# Patient Record
Sex: FEMALE | Race: BLACK OR AFRICAN AMERICAN | NOT HISPANIC OR LATINO | URBAN - METROPOLITAN AREA
[De-identification: names, ages, dates, MRNs, and addresses within clinical notes are randomized per-mention and may not be internally consistent; named-entity substitution may affect disease eponyms.]

---

## 2017-10-06 ENCOUNTER — OUTPATIENT (OUTPATIENT)
Dept: OUTPATIENT SERVICES | Facility: HOSPITAL | Age: 77
LOS: 1 days | End: 2017-10-06
Payer: MEDICARE

## 2017-10-06 ENCOUNTER — APPOINTMENT (OUTPATIENT)
Dept: CT IMAGING | Facility: IMAGING CENTER | Age: 77
End: 2017-10-06
Payer: MEDICARE

## 2017-10-06 DIAGNOSIS — Z98.89 OTHER SPECIFIED POSTPROCEDURAL STATES: Chronic | ICD-10-CM

## 2017-10-06 DIAGNOSIS — Z98.49 CATARACT EXTRACTION STATUS, UNSPECIFIED EYE: Chronic | ICD-10-CM

## 2017-10-06 DIAGNOSIS — Z00.8 ENCOUNTER FOR OTHER GENERAL EXAMINATION: ICD-10-CM

## 2017-10-06 PROCEDURE — 74176 CT ABD & PELVIS W/O CONTRAST: CPT

## 2017-10-06 PROCEDURE — 74176 CT ABD & PELVIS W/O CONTRAST: CPT | Mod: 26

## 2018-12-20 ENCOUNTER — APPOINTMENT (OUTPATIENT)
Dept: ORTHOPEDIC SURGERY | Facility: CLINIC | Age: 78
End: 2018-12-20
Payer: MEDICARE

## 2018-12-20 VITALS — BODY MASS INDEX: 21.16 KG/M2 | WEIGHT: 127 LBS | HEIGHT: 65 IN

## 2018-12-20 DIAGNOSIS — M16.0 BILATERAL PRIMARY OSTEOARTHRITIS OF HIP: ICD-10-CM

## 2018-12-20 PROCEDURE — 99203 OFFICE O/P NEW LOW 30 MIN: CPT

## 2018-12-20 PROCEDURE — 72170 X-RAY EXAM OF PELVIS: CPT

## 2018-12-20 PROCEDURE — 72100 X-RAY EXAM L-S SPINE 2/3 VWS: CPT

## 2018-12-20 RX ORDER — ACETAMINOPHEN 325 MG/1
325 TABLET, FILM COATED ORAL
Refills: 0 | Status: ACTIVE | COMMUNITY

## 2018-12-21 ENCOUNTER — OUTPATIENT (OUTPATIENT)
Dept: OUTPATIENT SERVICES | Facility: HOSPITAL | Age: 78
LOS: 1 days | End: 2018-12-21
Payer: MEDICARE

## 2018-12-21 ENCOUNTER — APPOINTMENT (OUTPATIENT)
Dept: MRI IMAGING | Facility: CLINIC | Age: 78
End: 2018-12-21
Payer: MEDICARE

## 2018-12-21 DIAGNOSIS — Z98.49 CATARACT EXTRACTION STATUS, UNSPECIFIED EYE: Chronic | ICD-10-CM

## 2018-12-21 DIAGNOSIS — Z00.8 ENCOUNTER FOR OTHER GENERAL EXAMINATION: ICD-10-CM

## 2018-12-21 DIAGNOSIS — Z98.89 OTHER SPECIFIED POSTPROCEDURAL STATES: Chronic | ICD-10-CM

## 2018-12-21 PROCEDURE — 72148 MRI LUMBAR SPINE W/O DYE: CPT

## 2018-12-21 PROCEDURE — 72148 MRI LUMBAR SPINE W/O DYE: CPT | Mod: 26

## 2019-07-19 ENCOUNTER — EMERGENCY (EMERGENCY)
Facility: HOSPITAL | Age: 79
LOS: 1 days | Discharge: HOME | End: 2019-07-19
Attending: EMERGENCY MEDICINE | Admitting: EMERGENCY MEDICINE
Payer: MEDICARE

## 2019-07-19 VITALS
SYSTOLIC BLOOD PRESSURE: 168 MMHG | DIASTOLIC BLOOD PRESSURE: 77 MMHG | TEMPERATURE: 98 F | OXYGEN SATURATION: 100 % | HEART RATE: 89 BPM | RESPIRATION RATE: 16 BRPM

## 2019-07-19 VITALS
DIASTOLIC BLOOD PRESSURE: 80 MMHG | SYSTOLIC BLOOD PRESSURE: 174 MMHG | OXYGEN SATURATION: 100 % | HEART RATE: 63 BPM | TEMPERATURE: 98 F | RESPIRATION RATE: 16 BRPM

## 2019-07-19 DIAGNOSIS — Z98.89 OTHER SPECIFIED POSTPROCEDURAL STATES: Chronic | ICD-10-CM

## 2019-07-19 DIAGNOSIS — Z98.49 CATARACT EXTRACTION STATUS, UNSPECIFIED EYE: Chronic | ICD-10-CM

## 2019-07-19 LAB
ALBUMIN SERPL ELPH-MCNC: 4.5 G/DL — SIGNIFICANT CHANGE UP (ref 3.3–5)
ALP SERPL-CCNC: 59 U/L — SIGNIFICANT CHANGE UP (ref 40–120)
ALT FLD-CCNC: 13 U/L — SIGNIFICANT CHANGE UP (ref 4–33)
ANION GAP SERPL CALC-SCNC: 9 MMO/L — SIGNIFICANT CHANGE UP (ref 7–14)
APTT BLD: 31.4 SEC — SIGNIFICANT CHANGE UP (ref 27.5–36.3)
AST SERPL-CCNC: 16 U/L — SIGNIFICANT CHANGE UP (ref 4–32)
BASOPHILS # BLD AUTO: 0.01 K/UL — SIGNIFICANT CHANGE UP (ref 0–0.2)
BASOPHILS NFR BLD AUTO: 0.2 % — SIGNIFICANT CHANGE UP (ref 0–2)
BILIRUB SERPL-MCNC: 0.4 MG/DL — SIGNIFICANT CHANGE UP (ref 0.2–1.2)
BUN SERPL-MCNC: 9 MG/DL — SIGNIFICANT CHANGE UP (ref 7–23)
CALCIUM SERPL-MCNC: 10.1 MG/DL — SIGNIFICANT CHANGE UP (ref 8.4–10.5)
CHLORIDE SERPL-SCNC: 104 MMOL/L — SIGNIFICANT CHANGE UP (ref 98–107)
CO2 SERPL-SCNC: 28 MMOL/L — SIGNIFICANT CHANGE UP (ref 22–31)
CREAT SERPL-MCNC: 0.9 MG/DL — SIGNIFICANT CHANGE UP (ref 0.5–1.3)
EOSINOPHIL # BLD AUTO: 0.08 K/UL — SIGNIFICANT CHANGE UP (ref 0–0.5)
EOSINOPHIL NFR BLD AUTO: 1.8 % — SIGNIFICANT CHANGE UP (ref 0–6)
GLUCOSE SERPL-MCNC: 98 MG/DL — SIGNIFICANT CHANGE UP (ref 70–99)
HCT VFR BLD CALC: 38.4 % — SIGNIFICANT CHANGE UP (ref 34.5–45)
HGB BLD-MCNC: 12.7 G/DL — SIGNIFICANT CHANGE UP (ref 11.5–15.5)
IMM GRANULOCYTES NFR BLD AUTO: 0.2 % — SIGNIFICANT CHANGE UP (ref 0–1.5)
INR BLD: 1.1 — SIGNIFICANT CHANGE UP (ref 0.88–1.17)
LIDOCAIN IGE QN: 43.8 U/L — SIGNIFICANT CHANGE UP (ref 7–60)
LYMPHOCYTES # BLD AUTO: 1.69 K/UL — SIGNIFICANT CHANGE UP (ref 1–3.3)
LYMPHOCYTES # BLD AUTO: 37.3 % — SIGNIFICANT CHANGE UP (ref 13–44)
MCHC RBC-ENTMCNC: 29.1 PG — SIGNIFICANT CHANGE UP (ref 27–34)
MCHC RBC-ENTMCNC: 33.1 % — SIGNIFICANT CHANGE UP (ref 32–36)
MCV RBC AUTO: 88.1 FL — SIGNIFICANT CHANGE UP (ref 80–100)
MONOCYTES # BLD AUTO: 0.43 K/UL — SIGNIFICANT CHANGE UP (ref 0–0.9)
MONOCYTES NFR BLD AUTO: 9.5 % — SIGNIFICANT CHANGE UP (ref 2–14)
NEUTROPHILS # BLD AUTO: 2.31 K/UL — SIGNIFICANT CHANGE UP (ref 1.8–7.4)
NEUTROPHILS NFR BLD AUTO: 51 % — SIGNIFICANT CHANGE UP (ref 43–77)
NRBC # FLD: 0 K/UL — SIGNIFICANT CHANGE UP (ref 0–0)
PLATELET # BLD AUTO: 168 K/UL — SIGNIFICANT CHANGE UP (ref 150–400)
PMV BLD: 12.2 FL — SIGNIFICANT CHANGE UP (ref 7–13)
POTASSIUM SERPL-MCNC: 4.3 MMOL/L — SIGNIFICANT CHANGE UP (ref 3.5–5.3)
POTASSIUM SERPL-SCNC: 4.3 MMOL/L — SIGNIFICANT CHANGE UP (ref 3.5–5.3)
PROT SERPL-MCNC: 7.7 G/DL — SIGNIFICANT CHANGE UP (ref 6–8.3)
PROTHROM AB SERPL-ACNC: 12.3 SEC — SIGNIFICANT CHANGE UP (ref 9.8–13.1)
RBC # BLD: 4.36 M/UL — SIGNIFICANT CHANGE UP (ref 3.8–5.2)
RBC # FLD: 12.3 % — SIGNIFICANT CHANGE UP (ref 10.3–14.5)
SODIUM SERPL-SCNC: 141 MMOL/L — SIGNIFICANT CHANGE UP (ref 135–145)
WBC # BLD: 4.53 K/UL — SIGNIFICANT CHANGE UP (ref 3.8–10.5)
WBC # FLD AUTO: 4.53 K/UL — SIGNIFICANT CHANGE UP (ref 3.8–10.5)

## 2019-07-19 PROCEDURE — 71250 CT THORAX DX C-: CPT | Mod: 26

## 2019-07-19 PROCEDURE — 74176 CT ABD & PELVIS W/O CONTRAST: CPT | Mod: 26

## 2019-07-19 PROCEDURE — 99284 EMERGENCY DEPT VISIT MOD MDM: CPT | Mod: 25,GC

## 2019-07-19 PROCEDURE — 93010 ELECTROCARDIOGRAM REPORT: CPT

## 2019-07-19 PROCEDURE — 71046 X-RAY EXAM CHEST 2 VIEWS: CPT | Mod: 26

## 2019-07-19 RX ORDER — LIDOCAINE 4 G/100G
1 CREAM TOPICAL ONCE
Refills: 0 | Status: COMPLETED | OUTPATIENT
Start: 2019-07-19 | End: 2019-07-19

## 2019-07-19 RX ORDER — ACETAMINOPHEN 500 MG
650 TABLET ORAL ONCE
Refills: 0 | Status: COMPLETED | OUTPATIENT
Start: 2019-07-19 | End: 2019-07-19

## 2019-07-19 RX ADMIN — Medication 650 MILLIGRAM(S): at 14:29

## 2019-07-19 RX ADMIN — LIDOCAINE 1 PATCH: 4 CREAM TOPICAL at 14:29

## 2019-07-19 NOTE — ED ADULT TRIAGE NOTE - CHIEF COMPLAINT QUOTE
Pt c/o falling down stairs this am after missing a step and fell into book case. C/O left posterior rib pain that increasing with deep breathing.  Denies head trauma or LOC.  Says bookcase saved her from falling to floor

## 2019-07-19 NOTE — ED PROVIDER NOTE - CLINICAL SUMMARY MEDICAL DECISION MAKING FREE TEXT BOX
78 y/o female with PMHx of Osteoporosis who presented to the ED for left rib pain s/p fall. Concern for rib fx/contusion. Labs, EKG, CT.

## 2019-07-19 NOTE — ED ADULT NURSE NOTE - NSIMPLEMENTINTERV_GEN_ALL_ED
Implemented All Universal Safety Interventions:  [place] to call system. Call bell, personal items and telephone within reach. Instruct patient to call for assistance. Room bathroom lighting operational. Non-slip footwear when patient is off stretcher. Physically safe environment: no spills, clutter or unnecessary equipment. Stretcher in lowest position, wheels locked, appropriate side rails in place.

## 2019-07-19 NOTE — ED PROVIDER NOTE - PSH
H/O tubal ligation    History of cataract surgery  b/l 2012  S/P bladder repair  2012  S/P breast biopsy, left  1962  S/P rhinoplasty  1993

## 2019-07-19 NOTE — ED PROVIDER NOTE - OBJECTIVE STATEMENT
78 y/o female with PMHx of Osteoporosis who presented to the ED for left rib pain s/p fall. Pt states she was coming down the stairs and slipped hitting her left side into a bookcase. Pt denies hitting her head or any LOC. Pt noted since having left sided rib pain, worse with deep inspiration. Pt denies any headache, neck pain, back pain, fever, chills, nausea, vomiting, SOB, chest pain, or abd pain.

## 2019-07-19 NOTE — ED PROVIDER NOTE - ATTENDING CONTRIBUTION TO CARE
Pt was seen and evaluated by me. Pt is a 80 y/o female with PMHx of Osteoporosis who presented to the ED for left rib pain s/p fall. Pt states she was coming down the stairs and slipped hitting her left side into a bookcase. Pt denies hitting her head or any LOC. Pt noted since having left sided rib pain, worse with deep inspiration. Pt denies any headache, neck pain, back pain, fever, chills, nausea, vomiting, SOB, chest pain, or abd pain. Lungs CTA b/l. RRR. Abd soft, non-tender. Tender to left intercostal area on left. No bruising noted. No midline neck or back tenderness. 5/5 b/l UE and LE.

## 2019-07-19 NOTE — ED PROVIDER NOTE - CARE PLAN
Principal Discharge DX:	Rib pain on left side  Secondary Diagnosis:	Fall, initial encounter Principal Discharge DX:	Rib contusion, left, initial encounter  Secondary Diagnosis:	Fall, initial encounter

## 2019-07-19 NOTE — ED ADULT NURSE NOTE - OBJECTIVE STATEMENT
79yof A&ox4 amb presents to ed s/p mechanical fall. pt missed step and fell into book shelf. pt did not hit head, states landed into bookshelf preventing her from hitting the ground. pt c/o L side/back pain. bruising noted to area. pt reports pain w/ inspiration and coughing. pt breathing even/unlabroed. abdomen soft, nontender, nondistended. skin warm, dry, and intact. 20g iv lock placed to R ac. labs sent.

## 2019-07-19 NOTE — ED PROVIDER NOTE - PROGRESS NOTE DETAILS
Elizabeth Goldberger PGY-3: pt feeling better after lido patch. CT scans neg for rib fx. Will dc Elizabeth Goldberger PGY-3: pt feeling better after lido patch. CT scans neg for rib fx. Will dc w incentive spirometer

## 2019-07-19 NOTE — ED ADULT NURSE NOTE - PMH
Age related osteoporosis    Arthritis  back  Bladder prolapse, female, acquired    Hay fever    Hiatal hernia

## 2019-08-14 ENCOUNTER — APPOINTMENT (OUTPATIENT)
Dept: ORTHOPEDIC SURGERY | Facility: CLINIC | Age: 79
End: 2019-08-14
Payer: MEDICARE

## 2019-08-14 VITALS
DIASTOLIC BLOOD PRESSURE: 78 MMHG | SYSTOLIC BLOOD PRESSURE: 178 MMHG | WEIGHT: 128 LBS | HEIGHT: 66 IN | HEART RATE: 62 BPM | BODY MASS INDEX: 20.57 KG/M2

## 2019-08-14 DIAGNOSIS — Z78.9 OTHER SPECIFIED HEALTH STATUS: ICD-10-CM

## 2019-08-14 PROCEDURE — 99214 OFFICE O/P EST MOD 30 MIN: CPT

## 2019-08-14 PROCEDURE — 72100 X-RAY EXAM L-S SPINE 2/3 VWS: CPT

## 2019-08-14 RX ORDER — NETTLE LEAF 2 %
POWDER (GRAM) MISCELLANEOUS
Refills: 0 | Status: ACTIVE | COMMUNITY

## 2019-08-14 RX ORDER — CHOLECALCIFEROL (VITAMIN D3) 25 MCG
TABLET ORAL
Refills: 0 | Status: ACTIVE | COMMUNITY

## 2019-08-14 RX ORDER — ASCORBIC ACID 500 MG
TABLET ORAL
Refills: 0 | Status: ACTIVE | COMMUNITY

## 2019-08-14 RX ORDER — PNV NO.95/FERROUS FUM/FOLIC AC 28MG-0.8MG
TABLET ORAL
Refills: 0 | Status: ACTIVE | COMMUNITY

## 2019-08-14 NOTE — DISCUSSION/SUMMARY
[de-identified] : The patient was advised of her findings and shown her x-rays. She was offered a course of physical therapy and declined.\par \par The patient is unable to take NSAIDs do to allergy and GI sensitivity further recommendations they include pain management.\par \par Followup p.r.n.

## 2019-08-14 NOTE — PHYSICAL EXAM
[de-identified] : Physical examination of the lumbosacral spine discloses stable nontender range of motion with flexion to 90°. His straight leg raise. Oral flexion 30° either side. No acute neurovascular deficits the lower extremities. Deep tendon reflexes are equal and symmetric no sensory or motor changes [de-identified] : X-rays taken of the lumbosacral spine in AP and lateral projections including the pelvis AP standing disclose mild facet joint degenerative changes. Intravertebral disc  spaces are fairly well maintained.

## 2019-08-14 NOTE — HISTORY OF PRESENT ILLNESS
[Worsening] : worsening [___ mths] : [unfilled] month(s) ago [6] : an average pain level of 6/10 [0] : a minimum pain level of 0/10 [9] : a maximum pain level of 9/10 [Sitting] : sitting [Intermit.] : ~He/She~ states the symptoms seem to be intermittent [Bending] : worsened by bending [Heat] : relieved by heat [Ice] : relieved by ice [de-identified] : Pt returns for pain in her lower back, there is radiating pain into her hips, pt states her toes are numb and tingling,  to the right leg.Tylenol  and hot showers  taken for pain.. Pt states she has hx of herniated disc. She feeling the most back discomfort while bending.Pt was seen in ER  approx 1 month ago,xrays were taken, pt evident banged herself into book case.She does not remember when her last bone density was taken. [de-identified] : most pain while bending or reaching  [de-identified] : tylenol

## 2019-12-06 ENCOUNTER — APPOINTMENT (OUTPATIENT)
Dept: ORTHOPEDIC SURGERY | Facility: CLINIC | Age: 79
End: 2019-12-06

## 2021-01-14 ENCOUNTER — TRANSCRIPTION ENCOUNTER (OUTPATIENT)
Age: 81
End: 2021-01-14

## 2021-01-19 ENCOUNTER — TRANSCRIPTION ENCOUNTER (OUTPATIENT)
Age: 81
End: 2021-01-19

## 2021-01-21 ENCOUNTER — APPOINTMENT (OUTPATIENT)
Dept: CARDIOLOGY | Facility: CLINIC | Age: 81
End: 2021-01-21
Payer: MEDICARE

## 2021-01-21 ENCOUNTER — NON-APPOINTMENT (OUTPATIENT)
Age: 81
End: 2021-01-21

## 2021-01-21 VITALS
SYSTOLIC BLOOD PRESSURE: 155 MMHG | BODY MASS INDEX: 21.16 KG/M2 | DIASTOLIC BLOOD PRESSURE: 83 MMHG | OXYGEN SATURATION: 98 % | TEMPERATURE: 96.2 F | WEIGHT: 127 LBS | HEIGHT: 65 IN | RESPIRATION RATE: 16 BRPM | HEART RATE: 71 BPM

## 2021-01-21 PROCEDURE — 93000 ELECTROCARDIOGRAM COMPLETE: CPT

## 2021-01-21 PROCEDURE — 99205 OFFICE O/P NEW HI 60 MIN: CPT

## 2021-01-21 NOTE — PHYSICAL EXAM
[Normal Appearance] : normal appearance [General Appearance - Well Developed] : well developed [Well Groomed] : well groomed [General Appearance - Well Nourished] : well nourished [No Deformities] : no deformities [General Appearance - In No Acute Distress] : no acute distress [Normal Conjunctiva] : the conjunctiva exhibited no abnormalities [Eyelids - No Xanthelasma] : the eyelids demonstrated no xanthelasmas [Normal Oral Mucosa] : normal oral mucosa [No Oral Pallor] : no oral pallor [No Oral Cyanosis] : no oral cyanosis [Normal Jugular Venous A Waves Present] : normal jugular venous A waves present [Normal Jugular Venous V Waves Present] : normal jugular venous V waves present [No Jugular Venous Covarrubias A Waves] : no jugular venous covarrubias A waves

## 2021-01-21 NOTE — REASON FOR VISIT
[FreeTextEntry1] : Left shoulder pain first in Dec 2020\par Did PT better\par Than right arm and shoulder pain\par Now with substernal chest pressure -- appears atypical, not necessarily related to exertion.\par Has probable underlying arthritis.\par No other associated cardiac complaints.  not on cardiac medications.\par \par Hypertensive 150s/80s in office.\par Unremarkable physical examination, appears euvolemic on exam.\par No recent cardiac testing.\par Will arrange for echocardiogram and nuclear stress test.\par Total time 60 minutes\par General Complaint: \par    81 y/o female with no Pmh c/o neck pain and headache. Stated \par     that the pain starts in her neck and goes to her and also travels \par     down her back. Stated that the pain was there a long time, went \par     to see neurologist in december where she had an EMG done. Stated \par     that yesterday her pain worsned so she came for evaluation. \par     Patient also c/o some "chest burning" sensation x a few days. \par     Denies any N/V, SOB. Denies pain but states that it just burns. \par     Stated that she thought it was gas, so she tried some hot tea \par     which helped. Denies any previous hx of smoking or any other \par     family history of cardiac disease. \par CURRENT MEDICATIONS \par     None \par PAST MEDICAL HISTORY \par     Medical History Verified. \par ALLERGIES \par     N.K.D.A. \par FAMILY HISTORY \par     Mother:  \par     Father:  \par     Non-Contributory \par SOCIAL HISTORY \par      - Tobacco Use: \par     Tobacco Use/Smoking \par     You are anonsmoker \par     Miscellaneous: \par     BMI \par     Were height and weight measured and recorded today? Yes \par     Fall Risk \par     Is patient 65 and older?Yes \par     Have you had a fall or been concerned about balance or falling in \par     the last year?No \par REVIEW OF SYSTEMS \par   Constitutional: \par     fever  denies, denies . dizziness  denies . \par   ENT: \par     sore throat  denies . \par   Cardiovascular: \par     chest pain  denies . \par   Respiratory: \par     SOB  none . \par   Gastrointestinal: \par     nausea  none . vomiting  none . \par   Neurologic: \par     headache  denies . Head trauma  None, and denies LOC . \par VITAL SIGNS \par     Temp 98.1 F, HR 76 /min, /70 mm Hg, RR 17 /min, Oxygen sat \par     % 95 %, Ht 65 in, Wt 125 lbs, Wt-kg 56.7 kg, BMI 20.80 Index, \par     Ht-cm 165.1 cm. \par EXAMINATION \par   General PE: \par     GENERAL:Comfortable, mild painful distress noted , no acute \par     distress, well developed, well nourished . \par     EARS/NOSE:no nasal discharge or epistaxis noted . \par     MOUTH/THROATmoist mucous membranes , moist mucous membranes . \par     LUNGS:No respiratory distress , No respiratory distress . \par     ABDOMINAL:non-distended . \par     EXTREMITIES:Moves UE's, LE's without difficulty , Perfused . \par     PSYCHIATRIC:Affect normal, Interactive, conversant , Affect \par     normal, Interactive, conversant . \par     HEAD:normocephalic, no focal tenderness , normocephalic, \par     atraumatic. . \par     SKIN/Woundwarm, dry, no rash on visible skin , warm, dry, no rash \par     on visible skin . \par     EYES:both eyes, sclera non-icteric, no conjunctival injection, \par     pupils equal, round , sclera non-icteric, no conjunctival \par     injection, pupils equal, round, EOMI . \par     NECKfull active ROM without pain . \par     HEART:regular rate and rhythm , regular rate and rhythm . \par     BACK:no midline spine tenderness. \par     NEUROLOGIC:appears alert and oriented x3, speech normal, face \par     symmetric, gait grossly normal , appears alert and oriented x3, \par     speech normal, moves all 4 extremities, face symmetric . \par ASSESSMENTS \par     Neck pain - M54.2 (Primary) \par     Gastroesophageal reflux disease without esophagitis - K21.9 \par TREATMENT \par   Neck pain \par     Clinical Notes: Instructed to take OTC pain meds such as tylenol \par     for relief and return to Neurologist. \par   Gastroesophageal reflux disease without esophagitis \par     [id number],Tasnia 2021 5:16:22 PM > NSR at 61, no \par     ST-T wave changes \par     Clinical Notes: \par     Patient appears stable, no CAD risk factors. Will send referral \par     to Cardiologist \par     . \par    REFERRAL TO:[facility] \par    REASON:"chest burning" \par

## 2021-01-21 NOTE — REVIEW OF SYSTEMS
[Eyeglasses] : currently wearing eyeglasses [Chest  Pressure] : chest pressure [Chest Pain] : chest pain [Joint Pain] : joint pain [Joint Stiffness] : joint stiffness [Limb Weakness (Paresis)] : limb weakness [Negative] : Heme/Lymph

## 2021-01-22 LAB
ALBUMIN SERPL ELPH-MCNC: 4.7 G/DL
ALP BLD-CCNC: 55 U/L
ALT SERPL-CCNC: 16 U/L
ANION GAP SERPL CALC-SCNC: 15 MMOL/L
AST SERPL-CCNC: 22 U/L
BASOPHILS # BLD AUTO: 0 K/UL
BASOPHILS NFR BLD AUTO: 0 %
BILIRUB SERPL-MCNC: 0.3 MG/DL
BUN SERPL-MCNC: 9 MG/DL
CALCIUM SERPL-MCNC: 9.9 MG/DL
CHLORIDE SERPL-SCNC: 102 MMOL/L
CHOLEST SERPL-MCNC: 183 MG/DL
CO2 SERPL-SCNC: 25 MMOL/L
CREAT SERPL-MCNC: 0.81 MG/DL
EOSINOPHIL # BLD AUTO: 0.03 K/UL
EOSINOPHIL NFR BLD AUTO: 0.9 %
ESTIMATED AVERAGE GLUCOSE: 120 MG/DL
GLUCOSE SERPL-MCNC: 66 MG/DL
HBA1C MFR BLD HPLC: 5.8 %
HCT VFR BLD CALC: 38.9 %
HDLC SERPL-MCNC: 69 MG/DL
HGB BLD-MCNC: 12.5 G/DL
IMM GRANULOCYTES NFR BLD AUTO: 0 %
LDLC SERPL CALC-MCNC: 95 MG/DL
LYMPHOCYTES # BLD AUTO: 0.85 K/UL
LYMPHOCYTES NFR BLD AUTO: 24.9 %
MAN DIFF?: NORMAL
MCHC RBC-ENTMCNC: 29.8 PG
MCHC RBC-ENTMCNC: 32.1 GM/DL
MCV RBC AUTO: 92.6 FL
MONOCYTES # BLD AUTO: 0.43 K/UL
MONOCYTES NFR BLD AUTO: 12.6 %
NEUTROPHILS # BLD AUTO: 2.1 K/UL
NEUTROPHILS NFR BLD AUTO: 61.6 %
NONHDLC SERPL-MCNC: 113 MG/DL
PLATELET # BLD AUTO: 175 K/UL
POTASSIUM SERPL-SCNC: 4.2 MMOL/L
PROT SERPL-MCNC: 7.7 G/DL
RBC # BLD: 4.2 M/UL
RBC # FLD: 12.7 %
SODIUM SERPL-SCNC: 142 MMOL/L
TRIGL SERPL-MCNC: 91 MG/DL
TSH SERPL-ACNC: 1.32 UIU/ML
WBC # FLD AUTO: 3.41 K/UL

## 2021-01-29 ENCOUNTER — APPOINTMENT (OUTPATIENT)
Dept: CV DIAGNOSITCS | Facility: HOSPITAL | Age: 81
End: 2021-01-29
Payer: MEDICARE

## 2021-01-29 ENCOUNTER — APPOINTMENT (OUTPATIENT)
Dept: CV DIAGNOSTICS | Facility: HOSPITAL | Age: 81
End: 2021-01-29
Payer: MEDICARE

## 2021-01-29 ENCOUNTER — OUTPATIENT (OUTPATIENT)
Dept: OUTPATIENT SERVICES | Facility: HOSPITAL | Age: 81
LOS: 1 days | End: 2021-01-29

## 2021-01-29 DIAGNOSIS — R07.89 OTHER CHEST PAIN: ICD-10-CM

## 2021-01-29 DIAGNOSIS — Z98.89 OTHER SPECIFIED POSTPROCEDURAL STATES: Chronic | ICD-10-CM

## 2021-01-29 DIAGNOSIS — Z98.49 CATARACT EXTRACTION STATUS, UNSPECIFIED EYE: Chronic | ICD-10-CM

## 2021-01-29 PROCEDURE — 93018 CV STRESS TEST I&R ONLY: CPT | Mod: GC

## 2021-01-29 PROCEDURE — 93306 TTE W/DOPPLER COMPLETE: CPT | Mod: 26

## 2021-01-29 PROCEDURE — 78452 HT MUSCLE IMAGE SPECT MULT: CPT | Mod: 26

## 2021-01-29 PROCEDURE — 93016 CV STRESS TEST SUPVJ ONLY: CPT | Mod: GC

## 2021-02-04 ENCOUNTER — TRANSCRIPTION ENCOUNTER (OUTPATIENT)
Age: 81
End: 2021-02-04

## 2021-02-04 ENCOUNTER — EMERGENCY (EMERGENCY)
Facility: HOSPITAL | Age: 81
LOS: 1 days | Discharge: HOME | End: 2021-02-04
Attending: EMERGENCY MEDICINE | Admitting: EMERGENCY MEDICINE
Payer: MEDICARE

## 2021-02-04 VITALS
SYSTOLIC BLOOD PRESSURE: 164 MMHG | HEART RATE: 68 BPM | HEIGHT: 65 IN | TEMPERATURE: 98 F | RESPIRATION RATE: 18 BRPM | OXYGEN SATURATION: 98 % | DIASTOLIC BLOOD PRESSURE: 70 MMHG

## 2021-02-04 DIAGNOSIS — Z98.89 OTHER SPECIFIED POSTPROCEDURAL STATES: Chronic | ICD-10-CM

## 2021-02-04 DIAGNOSIS — Z98.49 CATARACT EXTRACTION STATUS, UNSPECIFIED EYE: Chronic | ICD-10-CM

## 2021-02-04 PROCEDURE — 99284 EMERGENCY DEPT VISIT MOD MDM: CPT

## 2021-02-04 NOTE — ED ADULT TRIAGE NOTE - CHIEF COMPLAINT QUOTE
Pt presents to ED ambulatory from home with c/o RLE swelling and redness and pain x 3-4 days. Pt was seen at urgent care earlier today and advised to come to ED for further eval. Pt denies chest pain or difficulty breathing.

## 2021-02-05 VITALS
OXYGEN SATURATION: 100 % | TEMPERATURE: 98 F | RESPIRATION RATE: 16 BRPM | HEART RATE: 73 BPM | SYSTOLIC BLOOD PRESSURE: 169 MMHG | DIASTOLIC BLOOD PRESSURE: 87 MMHG

## 2021-02-05 LAB
ALBUMIN SERPL ELPH-MCNC: 4.4 G/DL — SIGNIFICANT CHANGE UP (ref 3.3–5)
ALP SERPL-CCNC: 59 U/L — SIGNIFICANT CHANGE UP (ref 40–120)
ALT FLD-CCNC: 12 U/L — SIGNIFICANT CHANGE UP (ref 4–33)
ANION GAP SERPL CALC-SCNC: 13 MMOL/L — SIGNIFICANT CHANGE UP (ref 7–14)
AST SERPL-CCNC: 13 U/L — SIGNIFICANT CHANGE UP (ref 4–32)
BASOPHILS # BLD AUTO: 0 K/UL — SIGNIFICANT CHANGE UP (ref 0–0.2)
BASOPHILS NFR BLD AUTO: 0 % — SIGNIFICANT CHANGE UP (ref 0–2)
BILIRUB SERPL-MCNC: 0.5 MG/DL — SIGNIFICANT CHANGE UP (ref 0.2–1.2)
BUN SERPL-MCNC: 9 MG/DL — SIGNIFICANT CHANGE UP (ref 7–23)
CALCIUM SERPL-MCNC: 10 MG/DL — SIGNIFICANT CHANGE UP (ref 8.4–10.5)
CHLORIDE SERPL-SCNC: 104 MMOL/L — SIGNIFICANT CHANGE UP (ref 98–107)
CO2 SERPL-SCNC: 27 MMOL/L — SIGNIFICANT CHANGE UP (ref 22–31)
CREAT SERPL-MCNC: 0.8 MG/DL — SIGNIFICANT CHANGE UP (ref 0.5–1.3)
EOSINOPHIL # BLD AUTO: 0.03 K/UL — SIGNIFICANT CHANGE UP (ref 0–0.5)
EOSINOPHIL NFR BLD AUTO: 0.9 % — SIGNIFICANT CHANGE UP (ref 0–6)
FOLATE SERPL-MCNC: 20 NG/ML — HIGH (ref 3.1–17.5)
GLUCOSE SERPL-MCNC: 97 MG/DL — SIGNIFICANT CHANGE UP (ref 70–99)
HCT VFR BLD CALC: 38.6 % — SIGNIFICANT CHANGE UP (ref 34.5–45)
HGB BLD-MCNC: 12.4 G/DL — SIGNIFICANT CHANGE UP (ref 11.5–15.5)
IANC: 2.1 K/UL — SIGNIFICANT CHANGE UP (ref 1.5–8.5)
IMM GRANULOCYTES NFR BLD AUTO: 0.3 % — SIGNIFICANT CHANGE UP (ref 0–1.5)
LYMPHOCYTES # BLD AUTO: 0.86 K/UL — LOW (ref 1–3.3)
LYMPHOCYTES # BLD AUTO: 25.3 % — SIGNIFICANT CHANGE UP (ref 13–44)
MCHC RBC-ENTMCNC: 29 PG — SIGNIFICANT CHANGE UP (ref 27–34)
MCHC RBC-ENTMCNC: 32.1 GM/DL — SIGNIFICANT CHANGE UP (ref 32–36)
MCV RBC AUTO: 90.4 FL — SIGNIFICANT CHANGE UP (ref 80–100)
MONOCYTES # BLD AUTO: 0.4 K/UL — SIGNIFICANT CHANGE UP (ref 0–0.9)
MONOCYTES NFR BLD AUTO: 11.8 % — SIGNIFICANT CHANGE UP (ref 2–14)
NEUTROPHILS # BLD AUTO: 2.1 K/UL — SIGNIFICANT CHANGE UP (ref 1.8–7.4)
NEUTROPHILS NFR BLD AUTO: 61.7 % — SIGNIFICANT CHANGE UP (ref 43–77)
NRBC # BLD: 0 /100 WBCS — SIGNIFICANT CHANGE UP
NRBC # FLD: 0 K/UL — SIGNIFICANT CHANGE UP
PLATELET # BLD AUTO: 177 K/UL — SIGNIFICANT CHANGE UP (ref 150–400)
POTASSIUM SERPL-MCNC: 4.2 MMOL/L — SIGNIFICANT CHANGE UP (ref 3.5–5.3)
POTASSIUM SERPL-SCNC: 4.2 MMOL/L — SIGNIFICANT CHANGE UP (ref 3.5–5.3)
PROT SERPL-MCNC: 7.4 G/DL — SIGNIFICANT CHANGE UP (ref 6–8.3)
RBC # BLD: 4.27 M/UL — SIGNIFICANT CHANGE UP (ref 3.8–5.2)
RBC # FLD: 12.4 % — SIGNIFICANT CHANGE UP (ref 10.3–14.5)
SODIUM SERPL-SCNC: 144 MMOL/L — SIGNIFICANT CHANGE UP (ref 135–145)
WBC # BLD: 3.4 K/UL — LOW (ref 3.8–10.5)
WBC # FLD AUTO: 3.4 K/UL — LOW (ref 3.8–10.5)

## 2021-02-05 PROCEDURE — 93971 EXTREMITY STUDY: CPT | Mod: 26,LT

## 2021-02-05 NOTE — ED PROVIDER NOTE - CLINICAL SUMMARY MEDICAL DECISION MAKING FREE TEXT BOX
h.o herniated disk, p.w intermittent burning sensation worse at night for 1 month w. herniated disk. mild numbness of the right foot. no urinary symptoms. unlikely HEATHER. concerning for dvt and will u/s and get labs for electrolyte induced paresthesia. no chest pain or shortness of breath. unlikely PE. no gross abn on LE. will reassess after labs and imaging.

## 2021-02-05 NOTE — ED PROVIDER NOTE - ATTENDING CONTRIBUTION TO CARE
MD Varma:  I performed a face to face bedside interview with patient regarding history of present illness, review of symptoms and past medical history. I completed an independent physical exam(documented below).  I have discussed patient's plan of care with resident.   I agree with note as stated above, having amended the EMR as needed to reflect my findings. I have discussed the assessment and plan of care.  This includes during the time I functioned as the attending physician for this patient.  PE:  Gen: Alert, NAD  Head: NC, AT,  EOMI, normal lids/conjunctiva  ENT:  normal hearing, patent oropharynx without erythema/exudate  Neck: +supple, no tenderness/meningismus/JVD, +Trachea midline  Chest: no chest wall tenderness, equal chest rise  Pulm: Bilateral BS, normal resp effort, no wheeze/stridor/retractions  CV: RRR, no M/R/G, +dist pulses  Abd: +BS, soft, NT/ND  Rectal: deferred  Mskel: no edema/erythema/cyanosis  Skin: no rash  Neuro: AAOx3  MDM:  79yo F, no significant pmh, c/o R calf/thigh pain w/ paresthesias of R foot, [place] [facility] [place] sent in for dvt study. physical exam not consistent w/ cellulitis or dvt but will get venous duplex study, labs, reassess.

## 2021-02-05 NOTE — ED ADULT NURSE NOTE - OBJECTIVE STATEMENT
Break Coverage RN: Received pt in room 14, ambulatory, pt A&Ox4, respirations even and unlabored b/l. Pt c/o R. leg pain and swelling for 1 week, went to Urgent Care and sent to ED to r/o DVT. Pt reports heaviness in leg when standing up. Denies SOB, chest pain, dizziness. Appears in no apparent distress. Awaiting US. Will continue to monitor.

## 2021-02-05 NOTE — ED PROVIDER NOTE - OBJECTIVE STATEMENT
80F, HTN, herniated disk, p.w intermittent right leg pain w. burning sensation for 1 month. Patient was evaluated at Urgent care and sent in for DVT. Burning sensation is located in lateral aspect of the calf and lateral thigh w. mild paresthesia of the foot. no weakness, fever, chest pain, shortness of breath, urinary symptoms.

## 2021-02-05 NOTE — ED PROVIDER NOTE - NSFOLLOWUPINSTRUCTIONS_ED_ALL_ED_FT
Thank you for visiting our Emergency Department, it has been a pleasure taking part in your healthcare. Please follow up with your primary doctor within x48 hours.    Your discharge diagnosis is: leg pain  Your ultrasound showed no blood clots.   please continue all your current medication.     Return precautions to the Emergency Department include but are not limited to: unrelenting nausea, vomiting, fever, chills, chest pain, shortness of breath, dizziness, abdominal pain, worsening pain, syncope, blood in urine or stool, headache that doesn't resolve, numbness or tingling, loss of sensation, loss of motor function, or any other concerning symptoms.

## 2021-02-05 NOTE — ED PROVIDER NOTE - PATIENT PORTAL LINK FT
You can access the FollowMyHealth Patient Portal offered by [facility] by registering at the following website: [email or web address]. By joining [employer]’s FollowMyHealth portal, you will also be able to view your health information using other applications (apps) compatible with our system.

## 2021-05-05 ENCOUNTER — APPOINTMENT (OUTPATIENT)
Dept: CARDIOLOGY | Facility: CLINIC | Age: 81
End: 2021-05-05
Payer: MEDICARE

## 2021-05-05 VITALS
RESPIRATION RATE: 16 BRPM | SYSTOLIC BLOOD PRESSURE: 162 MMHG | OXYGEN SATURATION: 97 % | BODY MASS INDEX: 21.47 KG/M2 | HEART RATE: 77 BPM | WEIGHT: 129 LBS | DIASTOLIC BLOOD PRESSURE: 85 MMHG

## 2021-05-05 DIAGNOSIS — Z13.6 ENCOUNTER FOR SCREENING FOR CARDIOVASCULAR DISORDERS: ICD-10-CM

## 2021-05-05 PROCEDURE — 93000 ELECTROCARDIOGRAM COMPLETE: CPT

## 2021-05-05 PROCEDURE — 99214 OFFICE O/P EST MOD 30 MIN: CPT

## 2021-05-08 PROBLEM — Z13.6 ENCOUNTER FOR SCREENING FOR VASCULAR DISEASE: Status: ACTIVE | Noted: 2021-05-08

## 2021-05-08 NOTE — PHYSICAL EXAM
[Well Developed] : well developed [Well Nourished] : well nourished [No Acute Distress] : no acute distress [Normal Conjunctiva] : normal conjunctiva [Normal Venous Pressure] : normal venous pressure [No Carotid Bruit] : no carotid bruit [Normal S1, S2] : normal S1, S2 [No Murmur] : no murmur [No Rub] : no rub [No Gallop] : no gallop [Clear Lung Fields] : clear lung fields [Good Air Entry] : good air entry [No Respiratory Distress] : no respiratory distress  [Soft] : abdomen soft [Non Tender] : non-tender [No Masses/organomegaly] : no masses/organomegaly [Normal Bowel Sounds] : normal bowel sounds [Normal Gait] : normal gait [No Edema] : no edema [No Clubbing] : no clubbing [No Cyanosis] : no cyanosis [No Varicosities] : no varicosities [No Rash] : no rash [No Skin Lesions] : no skin lesions [Moves all extremities] : moves all extremities [No Focal Deficits] : no focal deficits [Normal Speech] : normal speech [Normal memory] : normal memory [Alert and Oriented] : alert and oriented

## 2021-05-08 NOTE — REASON FOR VISIT
[FreeTextEntry1] : I had the pleasure of evaluating Ms. Tasneem Rai, who presents for follow-up cardiovascular evaluation.  I last saw the patient in 2021.  Since then, she now reports having new symptoms of leg discomfort and and swelling.  She also reports having symptoms of exertional leg discomfort.  Denies having rest pain.  No ulcers.\par \par On her prior visit, she had complained of having substernal chest pressure -- her symptoms appeared atypical, and were not necessarily related to exertion.  Stress test performed in 2021 noted no evidence of ischemia.  Echocardiogram normal biventricular systolic function LVEF 71%, moderate TR, mild pulmonary HTN.\par Her symptoms have improved since then.\par \par Remains hypertensive.  Hypertensive 150-160s/80s in office, similar to her prior visit.  \par She states that she is normal or near normal at home.\par Unremarkable physical examination, appears euvolemic on exam.\par \par Patient states that her BPs are elevated in the MD office.  WIll defer modification to regimen until I review home BP log in 2 wks.\par \par Total time 40 minutes\par \par General Complaint: \par    79 y/o female with no Pmh c/o neck pain and headache. Stated \par     that the pain starts in her neck and goes to her and also travels \par     down her back. Stated that the pain was there a long time, went \par     to see neurologist in december where she had an EMG done. Stated \par     that yesterday her pain worsned so she came for evaluation. \par     Patient also c/o some "chest burning" sensation x a few days. \par     Denies any N/V, SOB. Denies pain but states that it just burns. \par     Stated that she thought it was gas, so she tried some hot tea \par     which helped. Denies any previous hx of smoking or any other \par     family history of cardiac disease. \par CURRENT MEDICATIONS \par     None \par PAST MEDICAL HISTORY \par     Medical History Verified. \par ALLERGIES \par     N.K.D.A. \par FAMILY HISTORY \par     Mother:  \par     Father:  \par     Non-Contributory \par SOCIAL HISTORY \par     2020 - Tobacco Use: \par     Tobacco Use/Smoking \par     You are anonsmoker \par     Miscellaneous: \par     BMI \par     Were height and weight measured and recorded today? Yes \par     Fall Risk \par     Is patient 65 and older?Yes \par     Have you had a fall or been concerned about balance or falling in \par     the last year?No \par REVIEW OF SYSTEMS \par   Constitutional: \par     fever  denies, denies . dizziness  denies . \par   ENT: \par     sore throat  denies . \par   Cardiovascular: \par     chest pain  denies . \par   Respiratory: \par     SOB  none . \par   Gastrointestinal: \par     nausea  none . vomiting  none . \par   Neurologic: \par     headache  denies . Head trauma  None, and denies LOC . \par VITAL SIGNS \par     Temp 98.1 F, HR 76 /min, /70 mm Hg, RR 17 /min, Oxygen sat \par     % 95 %, Ht 65 in, Wt 125 lbs, Wt-kg 56.7 kg, BMI 20.80 Index, \par     Ht-cm 165.1 cm. \par EXAMINATION \par   General PE: \par     GENERAL:Comfortable, mild painful distress noted , no acute \par     distress, well developed, well nourished . \par     EARS/NOSE:no nasal discharge or epistaxis noted . \par     MOUTH/THROATmoist mucous membranes , moist mucous membranes . \par     LUNGS:No respiratory distress , No respiratory distress . \par     ABDOMINAL:non-distended . \par     EXTREMITIES:Moves UE's, LE's without difficulty , Perfused . \par     PSYCHIATRIC:Affect normal, Interactive, conversant , Affect \par     normal, Interactive, conversant . \par     HEAD:normocephalic, no focal tenderness , normocephalic, \par     atraumatic. . \par     SKIN/Woundwarm, dry, no rash on visible skin , warm, dry, no rash \par     on visible skin . \par     EYES:both eyes, sclera non-icteric, no conjunctival injection, \par     pupils equal, round , sclera non-icteric, no conjunctival \par     injection, pupils equal, round, EOMI . \par     NECKfull active ROM without pain . \par     HEART:regular rate and rhythm , regular rate and rhythm . \par     BACK:no midline spine tenderness. \par     NEUROLOGIC:appears alert and oriented x3, speech normal, face \par     symmetric, gait grossly normal , appears alert and oriented x3, \par     speech normal, moves all 4 extremities, face symmetric . \par ASSESSMENTS \par     Neck pain - M54.2 (Primary) \par     Gastroesophageal reflux disease without esophagitis - K21.9 \par TREATMENT \par   Neck pain \par     Clinical Notes: Instructed to take OTC pain meds such as tylenol \par     for relief and return to Neurologist. \par   Gastroesophageal reflux disease without esophagitis \par     [id number],Tasnia 2021 5:16:22 PM > NSR at 61, no \par     ST-T wave changes \par     Clinical Notes: \par     Patient appears stable, no CAD risk factors. Will send referral \par     to Cardiologist \par     . \par    REFERRAL TO:[facility] Cardiology \par    REASON:"chest burning" \par

## 2021-05-17 ENCOUNTER — APPOINTMENT (OUTPATIENT)
Dept: CV DIAGNOSITCS | Facility: HOSPITAL | Age: 81
End: 2021-05-17
Payer: MEDICARE

## 2021-05-17 ENCOUNTER — OUTPATIENT (OUTPATIENT)
Dept: OUTPATIENT SERVICES | Facility: HOSPITAL | Age: 81
LOS: 1 days | End: 2021-05-17

## 2021-05-17 DIAGNOSIS — Z98.49 CATARACT EXTRACTION STATUS, UNSPECIFIED EYE: Chronic | ICD-10-CM

## 2021-05-17 DIAGNOSIS — Z98.89 OTHER SPECIFIED POSTPROCEDURAL STATES: Chronic | ICD-10-CM

## 2021-05-17 DIAGNOSIS — M79.604 PAIN IN RIGHT LEG: ICD-10-CM

## 2021-05-17 PROCEDURE — 93923 UPR/LXTR ART STDY 3+ LVLS: CPT | Mod: 26

## 2021-05-17 PROCEDURE — 93970 EXTREMITY STUDY: CPT | Mod: 26

## 2021-07-01 ENCOUNTER — EMERGENCY (EMERGENCY)
Facility: HOSPITAL | Age: 81
LOS: 1 days | Discharge: HOME | End: 2021-07-01
Attending: EMERGENCY MEDICINE | Admitting: EMERGENCY MEDICINE
Payer: MEDICARE

## 2021-07-01 VITALS
SYSTOLIC BLOOD PRESSURE: 157 MMHG | RESPIRATION RATE: 16 BRPM | DIASTOLIC BLOOD PRESSURE: 67 MMHG | HEIGHT: 65 IN | TEMPERATURE: 98 F | HEART RATE: 70 BPM | OXYGEN SATURATION: 100 %

## 2021-07-01 DIAGNOSIS — Z98.49 CATARACT EXTRACTION STATUS, UNSPECIFIED EYE: Chronic | ICD-10-CM

## 2021-07-01 DIAGNOSIS — Z98.89 OTHER SPECIFIED POSTPROCEDURAL STATES: Chronic | ICD-10-CM

## 2021-07-01 PROCEDURE — 71046 X-RAY EXAM CHEST 2 VIEWS: CPT | Mod: 26

## 2021-07-01 PROCEDURE — 99284 EMERGENCY DEPT VISIT MOD MDM: CPT | Mod: 25,GC

## 2021-07-01 PROCEDURE — 93010 ELECTROCARDIOGRAM REPORT: CPT | Mod: GC

## 2021-07-01 RX ORDER — LIDOCAINE 4 G/100G
1 CREAM TOPICAL ONCE
Refills: 0 | Status: COMPLETED | OUTPATIENT
Start: 2021-07-01 | End: 2021-07-01

## 2021-07-01 NOTE — ED PROVIDER NOTE - ATTENDING CONTRIBUTION TO CARE
willie 82 yo woman with htn, neg stress within the year who has right neck pain.  on exam there is very palpable muscle spasm.  with muscle energy able to resolve some of the areas of pain and tenderness.  will eval as well with labs if they are ok will be able to go,  ecg non ischemic    I performed a history and physical exam of the patient and discussed their management with the resident and /or advanced care provider. I reviewed the resident and /or ACP's note and agree with the documented findings and plan of care. My medical decison making and observations are found above.

## 2021-07-01 NOTE — ED PROVIDER NOTE - OBJECTIVE STATEMENT
81 Y F H/O HTN presenting with head/neck pain for the last 1 week. States 1 week of intermittent neck pain, radiating in trapezius distribution, worse with pillow position and head motion. Recent negative stress test, echo, no EKG changes compared to baseline. Denies any vision change, pain with mastication, exertional exacerbation, fever, chills, abdominal pain, jaw/arm pain, nausea SOB.

## 2021-07-01 NOTE — ED PROVIDER NOTE - NSFOLLOWUPINSTRUCTIONS_ED_ALL_ED_FT
Headache    A headache is pain or discomfort felt around the head or neck area. The specific cause of a headache may not be found as there are many types including tension headaches, migraine headaches, and cluster headaches. Watch your condition for any changes. Things you can do to manage your pain include taking over the counter and prescription medications as instructed by your health care provider, lying down in a dark quiet room, limiting stress, getting regular sleep, and refraining from alcohol and tobacco products.    Take Lidocaine patches from any pharmacy applied directly to the affected area. Followup with your primary care doctor within the next 1-2 weeks for followup.    SEEK IMMEDIATE MEDICAL CARE IF YOU HAVE ANY OF THE FOLLOWING SYMPTOMS: fever, vomiting, stiff neck, loss of vision, problems with speech, muscle weakness, loss of balance, trouble walking, passing out, or confusion.

## 2021-07-01 NOTE — ED PROVIDER NOTE - PATIENT PORTAL LINK FT
You can access the FollowMyHealth Patient Portal offered by [facility] by registering at the following website: [email or web address]. By joining [employer]’s FollowMyHealth portal, you will also be able to view your health information using other applications (apps) compatible with our system.

## 2021-07-01 NOTE — ED PROVIDER NOTE - CLINICAL SUMMARY MEDICAL DECISION MAKING FREE TEXT BOX
81 Y F H/O HTN, recent negative stress, test, Echo, presenting with R. neck pain, Likely MSK related (worse with motion, tender to palpation, reproducible), low clinical concern for temporal arteritis (no worsening with mastication, no tenderness or mass palpated on temporal artery, no vision change on R. eye), ACS (recent stress test, Echo, no exertional component, EKG to be performed), Trigeminal neuralgia (no tenderness to palpation, electric sensation) 81 Y F H/O HTN, recent negative stress, test, Echo, presenting with R. neck pain, Likely MSK related (worse with motion, tender to palpation, reproducible), low clinical concern for temporal arteritis (no worsening with mastication, no tenderness or mass palpated on temporal artery, no vision change on R. eye), ACS (recent stress test, Echo, no exertional component, EKG to be performed), Trigeminal neuralgia (no tenderness to palpation, electric sensation)    willie 82 yo woman with htn, neg stress within the year who has right neck pain.  on exam there is very palpable muscle spasm.  with muscle energy able to resolve some of the areas of pain and tenderness.  will eval as well with labs if they are ok will be able to go,  ecg non ischemic

## 2021-07-01 NOTE — ED PROVIDER NOTE - NS ED ROS FT
GENERAL: No fever or chills  EYES: No change in vision  HEENT: No trouble swallowing or speaking  CARDIAC: No chest pain  PULMONARY: No cough or SOB  GI: No abdominal pain, no nausea or no vomiting, no diarrhea or constipation  : No changes in urination  SKIN: No rashes  NEURO: + headache/neck pain, no numbness  MSK: + Neck pain  Otherwise as HPI or negative.

## 2021-07-01 NOTE — ED ADULT TRIAGE NOTE - CHIEF COMPLAINT QUOTE
Pt c/o a headache that started yesterday, denies visual changes or dizziness, states the headache kept her up all night, took Tylenol without relief. States she also noted swelling to the RT side of her collar bone. PMH: herniated disc, osteopetrosis.

## 2021-07-01 NOTE — ED ADULT NURSE NOTE - CHIEF COMPLAINT
Moviprep Bowel prep - E-scribed to pharmacy.  Covid test ordered.    The patient is a 81y Female complaining of headache.

## 2021-07-01 NOTE — ED PROVIDER NOTE - PHYSICAL EXAMINATION
Physical Exam:  General: NAD, Conversive  Eyes: EOMI, Conjunctiva and sclera clear  Neck: No JVD, pain to palpation of R. trapezius, no c-spine tenderness  Head: No temporal artery mass, no pain elicited with jaw clench maneuver  Lungs: Clear to auscultation bilaterally, no wheeze, no rhonchi  Heart: Normal S1, S2, no murmurs  Abdomen: Soft, nontender, nondistended, no CVA tenderness  Extremities: 2+ peripheral pulses, no edema  Psych: AAO X3  Neurologic: Non-focal, no visual field deficit, CN II-XII grossly intact

## 2021-07-08 ENCOUNTER — APPOINTMENT (OUTPATIENT)
Dept: GASTROENTEROLOGY | Facility: CLINIC | Age: 81
End: 2021-07-08
Payer: MEDICARE

## 2021-07-08 VITALS
DIASTOLIC BLOOD PRESSURE: 80 MMHG | SYSTOLIC BLOOD PRESSURE: 132 MMHG | HEART RATE: 64 BPM | BODY MASS INDEX: 20.68 KG/M2 | WEIGHT: 124.13 LBS | TEMPERATURE: 97.7 F | OXYGEN SATURATION: 98 % | HEIGHT: 65 IN

## 2021-07-08 PROCEDURE — 99212 OFFICE O/P EST SF 10 MIN: CPT

## 2021-07-08 NOTE — PHYSICAL EXAM
[General Appearance - Alert] : alert [General Appearance - In No Acute Distress] : in no acute distress [General Appearance - Well Nourished] : well nourished [] : no respiratory distress [Respiration, Rhythm And Depth] : normal respiratory rhythm and effort [Auscultation Breath Sounds / Voice Sounds] : lungs were clear to auscultation bilaterally [Apical Impulse] : the apical impulse was normal [Heart Rate And Rhythm] : heart rate was normal and rhythm regular [Heart Sounds] : normal S1 and S2 [Bowel Sounds] : normal bowel sounds [Abdomen Soft] : soft [Abdomen Tenderness] : non-tender [FreeTextEntry1] : There was no significant distention or hyper tympanism.  There was mild asymmetry of the right abdominal wall on straining.

## 2021-07-08 NOTE — REVIEW OF SYSTEMS
[Abdominal Pain] : abdominal pain [Vomiting] : no vomiting [Constipation] : constipation [Heartburn] : no heartburn [Melena] : no melena [Negative] : Genitourinary [FreeTextEntry7] : Chronic constipation with occasional abdominal bloating.

## 2021-07-08 NOTE — ASSESSMENT
[FreeTextEntry1] : Mrs. Rai is an 81-year-old female who generally enjoys good health.  He has no significant cardiopulmonary issues but does have chronic constipation on the basis of a redundant and tortuous colon.  The patient did have a colonoscopy done 2 years ago.  I feel that her symptoms are purely on the basis of her underlying anatomy and she was reassured.  There are no signs of intestinal obstruction.  I told the patient to use MiraLAX on a regular basis and get back to me in several weeks.  She may be a candidate for Linzess and I did provide her with samples at the lowest dosage to see if this will help her if the MiraLAX does not.  I did caution her however that it may cause mild abdominal cramping.  The patient is concerned that there is more significant intra-abdominal pathology and I did state that if she does not feel better or  if the bloating becomes more problematic she will go for repeat CAT scan of the abdomen and pelvis and was provided with a slip.  We will decide on a necessity when she gets back to me with a progress report.  I do not feel she needs to repeat invasive evaluation.

## 2021-07-24 ENCOUNTER — OUTPATIENT (OUTPATIENT)
Dept: OUTPATIENT SERVICES | Facility: HOSPITAL | Age: 81
LOS: 1 days | End: 2021-07-24
Payer: MEDICARE

## 2021-07-24 ENCOUNTER — APPOINTMENT (OUTPATIENT)
Dept: CT IMAGING | Facility: CLINIC | Age: 81
End: 2021-07-24
Payer: MEDICARE

## 2021-07-24 DIAGNOSIS — R14.0 ABDOMINAL DISTENSION (GASEOUS): ICD-10-CM

## 2021-07-24 DIAGNOSIS — Z98.49 CATARACT EXTRACTION STATUS, UNSPECIFIED EYE: Chronic | ICD-10-CM

## 2021-07-24 DIAGNOSIS — Z98.89 OTHER SPECIFIED POSTPROCEDURAL STATES: Chronic | ICD-10-CM

## 2021-07-24 PROCEDURE — 74176 CT ABD & PELVIS W/O CONTRAST: CPT

## 2021-07-24 PROCEDURE — 74176 CT ABD & PELVIS W/O CONTRAST: CPT | Mod: 26,ME

## 2021-07-24 PROCEDURE — G1004: CPT

## 2022-02-17 ENCOUNTER — APPOINTMENT (OUTPATIENT)
Dept: INTERNAL MEDICINE | Facility: CLINIC | Age: 82
End: 2022-02-17

## 2022-02-25 ENCOUNTER — APPOINTMENT (OUTPATIENT)
Dept: OTOLARYNGOLOGY | Facility: CLINIC | Age: 82
End: 2022-02-25
Payer: MEDICARE

## 2022-02-25 VITALS
BODY MASS INDEX: 20.83 KG/M2 | TEMPERATURE: 97.6 F | HEIGHT: 65 IN | WEIGHT: 125 LBS | SYSTOLIC BLOOD PRESSURE: 187 MMHG | HEART RATE: 78 BPM | DIASTOLIC BLOOD PRESSURE: 84 MMHG

## 2022-02-25 DIAGNOSIS — J34.89 OTHER SPECIFIED DISORDERS OF NOSE AND NASAL SINUSES: ICD-10-CM

## 2022-02-25 DIAGNOSIS — R51.9 HEADACHE, UNSPECIFIED: ICD-10-CM

## 2022-02-25 DIAGNOSIS — R20.8 OTHER DISTURBANCES OF SKIN SENSATION: ICD-10-CM

## 2022-02-25 DIAGNOSIS — R07.0 PAIN IN THROAT: ICD-10-CM

## 2022-02-25 PROCEDURE — 99204 OFFICE O/P NEW MOD 45 MIN: CPT | Mod: 25

## 2022-02-25 PROCEDURE — 31231 NASAL ENDOSCOPY DX: CPT

## 2022-02-25 NOTE — HISTORY OF PRESENT ILLNESS
[de-identified] : Patient with a history of rhinoplasty after nasal trauma,  has been having a bilateral nasal burning for at least a year. SHe has been seen by another ENT and the pain has not improved. SHe was using Ayr with no improvement\par She has also noticed a small spot inside the nose which was dark and then all of a sudden it is fading and the pain worsens. The pain causes headaches that are general all over the head and radiate to the left ear\par She feels that there is something down in the throat sticking into the throat which is bothering her.

## 2022-02-25 NOTE — END OF VISIT
[FreeTextEntry3] : I saw and examined this patient in person. I have discussed with Sandra De Los Santos, Physician Assistant, in detail the above note and agree with the above assessment and plan of care.\par

## 2022-02-25 NOTE — CONSULT LETTER
[Dear  ___] : Dear  [unfilled], [Consult Letter:] : I had the pleasure of evaluating your patient, [unfilled]. [Please see my note below.] : Please see my note below. [Consult Closing:] : Thank you very much for allowing me to participate in the care of this patient.  If you have any questions, please do not hesitate to contact me. [Sincerely,] : Sincerely, [FreeTextEntry3] : Alonzo Baez MD\par  [facility]\par Otolaryngology and Facial Plastics\par Associated Professor, [employer]\par

## 2022-02-25 NOTE — ASSESSMENT
[FreeTextEntry1] : She had claims to have had a anoplasty many years ago by . Recently she feels that her nose has been burning somewhat and there is been some changes and some irregularities of her nose specifically a columella seems to be shifting and she is losing tip support I reassured her that this is part of the maturation process and is to be expected. Endoscopically a small septal perforation was noted all of her questions were answered I recommend that she continue using the nasal sprays AYA R that was recommended by another ENT and also reassured her that I agree it is not anything of concern with regard to any discoloration of her nose or infections. She will follow-up with us as needed.

## 2022-03-14 ENCOUNTER — INPATIENT (INPATIENT)
Facility: HOSPITAL | Age: 82
LOS: 1 days | Discharge: HOME | End: 2022-03-16
Attending: INTERNAL MEDICINE | Admitting: INTERNAL MEDICINE
Payer: MEDICARE

## 2022-03-14 VITALS
HEIGHT: 65 IN | TEMPERATURE: 98 F | SYSTOLIC BLOOD PRESSURE: 146 MMHG | DIASTOLIC BLOOD PRESSURE: 86 MMHG | HEART RATE: 68 BPM | RESPIRATION RATE: 18 BRPM | OXYGEN SATURATION: 100 %

## 2022-03-14 DIAGNOSIS — Z98.89 OTHER SPECIFIED POSTPROCEDURAL STATES: Chronic | ICD-10-CM

## 2022-03-14 DIAGNOSIS — R20.0 ANESTHESIA OF SKIN: ICD-10-CM

## 2022-03-14 DIAGNOSIS — N28.1 CYST OF KIDNEY, ACQUIRED: ICD-10-CM

## 2022-03-14 DIAGNOSIS — R20.2 PARESTHESIA OF SKIN: ICD-10-CM

## 2022-03-14 DIAGNOSIS — M81.0 AGE-RELATED OSTEOPOROSIS WITHOUT CURRENT PATHOLOGICAL FRACTURE: ICD-10-CM

## 2022-03-14 DIAGNOSIS — Z29.9 ENCOUNTER FOR PROPHYLACTIC MEASURES, UNSPECIFIED: ICD-10-CM

## 2022-03-14 DIAGNOSIS — M25.511 PAIN IN RIGHT SHOULDER: ICD-10-CM

## 2022-03-14 DIAGNOSIS — Z87.39 PERSONAL HISTORY OF OTHER DISEASES OF THE MUSCULOSKELETAL SYSTEM AND CONNECTIVE TISSUE: ICD-10-CM

## 2022-03-14 DIAGNOSIS — Z98.49 CATARACT EXTRACTION STATUS, UNSPECIFIED EYE: Chronic | ICD-10-CM

## 2022-03-14 LAB
ALBUMIN SERPL ELPH-MCNC: 4.6 G/DL — SIGNIFICANT CHANGE UP (ref 3.3–5)
ALP SERPL-CCNC: 67 U/L — SIGNIFICANT CHANGE UP (ref 40–120)
ALT FLD-CCNC: 11 U/L — SIGNIFICANT CHANGE UP (ref 4–33)
ANION GAP SERPL CALC-SCNC: 14 MMOL/L — SIGNIFICANT CHANGE UP (ref 7–14)
APPEARANCE UR: CLEAR — SIGNIFICANT CHANGE UP
APTT BLD: 31.8 SEC — SIGNIFICANT CHANGE UP (ref 27–36.3)
AST SERPL-CCNC: 22 U/L — SIGNIFICANT CHANGE UP (ref 4–32)
BASE EXCESS BLDV CALC-SCNC: 1.9 MMOL/L — SIGNIFICANT CHANGE UP (ref -2–3)
BASOPHILS # BLD AUTO: 0.01 K/UL — SIGNIFICANT CHANGE UP (ref 0–0.2)
BASOPHILS NFR BLD AUTO: 0.2 % — SIGNIFICANT CHANGE UP (ref 0–2)
BILIRUB SERPL-MCNC: 0.4 MG/DL — SIGNIFICANT CHANGE UP (ref 0.2–1.2)
BILIRUB UR-MCNC: NEGATIVE — SIGNIFICANT CHANGE UP
BLOOD GAS VENOUS COMPREHENSIVE RESULT: SIGNIFICANT CHANGE UP
BUN SERPL-MCNC: 7 MG/DL — SIGNIFICANT CHANGE UP (ref 7–23)
CALCIUM SERPL-MCNC: 9.7 MG/DL — SIGNIFICANT CHANGE UP (ref 8.4–10.5)
CHLORIDE BLDV-SCNC: 103 MMOL/L — SIGNIFICANT CHANGE UP (ref 96–108)
CHLORIDE SERPL-SCNC: 104 MMOL/L — SIGNIFICANT CHANGE UP (ref 98–107)
CO2 BLDV-SCNC: 30.2 MMOL/L — HIGH (ref 22–26)
CO2 SERPL-SCNC: 23 MMOL/L — SIGNIFICANT CHANGE UP (ref 22–31)
COLOR SPEC: COLORLESS — SIGNIFICANT CHANGE UP
CREAT SERPL-MCNC: 0.96 MG/DL — SIGNIFICANT CHANGE UP (ref 0.5–1.3)
DIFF PNL FLD: NEGATIVE — SIGNIFICANT CHANGE UP
EGFR: 59 ML/MIN/1.73M2 — LOW
EOSINOPHIL # BLD AUTO: 0.09 K/UL — SIGNIFICANT CHANGE UP (ref 0–0.5)
EOSINOPHIL NFR BLD AUTO: 2.2 % — SIGNIFICANT CHANGE UP (ref 0–6)
FLUAV AG NPH QL: SIGNIFICANT CHANGE UP
FLUBV AG NPH QL: SIGNIFICANT CHANGE UP
GAS PNL BLDV: 137 MMOL/L — SIGNIFICANT CHANGE UP (ref 136–145)
GLUCOSE BLDV-MCNC: 92 MG/DL — SIGNIFICANT CHANGE UP (ref 70–99)
GLUCOSE SERPL-MCNC: 99 MG/DL — SIGNIFICANT CHANGE UP (ref 70–99)
GLUCOSE UR QL: NEGATIVE — SIGNIFICANT CHANGE UP
HCO3 BLDV-SCNC: 29 MMOL/L — SIGNIFICANT CHANGE UP (ref 22–29)
HCT VFR BLD CALC: 36.8 % — SIGNIFICANT CHANGE UP (ref 34.5–45)
HCT VFR BLDA CALC: 38 % — SIGNIFICANT CHANGE UP (ref 34.5–46.5)
HGB BLD CALC-MCNC: 12.6 G/DL — SIGNIFICANT CHANGE UP (ref 11.5–15.5)
HGB BLD-MCNC: 12.7 G/DL — SIGNIFICANT CHANGE UP (ref 11.5–15.5)
IANC: 2.39 K/UL — SIGNIFICANT CHANGE UP (ref 1.5–8.5)
IMM GRANULOCYTES NFR BLD AUTO: 0.5 % — SIGNIFICANT CHANGE UP (ref 0–1.5)
INR BLD: 1.14 RATIO — SIGNIFICANT CHANGE UP (ref 0.88–1.16)
KETONES UR-MCNC: NEGATIVE — SIGNIFICANT CHANGE UP
LACTATE BLDV-MCNC: 1 MMOL/L — SIGNIFICANT CHANGE UP (ref 0.5–2)
LEUKOCYTE ESTERASE UR-ACNC: NEGATIVE — SIGNIFICANT CHANGE UP
LIDOCAIN IGE QN: 44 U/L — SIGNIFICANT CHANGE UP (ref 7–60)
LYMPHOCYTES # BLD AUTO: 1.09 K/UL — SIGNIFICANT CHANGE UP (ref 1–3.3)
LYMPHOCYTES # BLD AUTO: 26.9 % — SIGNIFICANT CHANGE UP (ref 13–44)
MAGNESIUM SERPL-MCNC: 1.8 MG/DL — SIGNIFICANT CHANGE UP (ref 1.6–2.6)
MCHC RBC-ENTMCNC: 30.5 PG — SIGNIFICANT CHANGE UP (ref 27–34)
MCHC RBC-ENTMCNC: 34.5 GM/DL — SIGNIFICANT CHANGE UP (ref 32–36)
MCV RBC AUTO: 88.2 FL — SIGNIFICANT CHANGE UP (ref 80–100)
MONOCYTES # BLD AUTO: 0.45 K/UL — SIGNIFICANT CHANGE UP (ref 0–0.9)
MONOCYTES NFR BLD AUTO: 11.1 % — SIGNIFICANT CHANGE UP (ref 2–14)
NEUTROPHILS # BLD AUTO: 2.39 K/UL — SIGNIFICANT CHANGE UP (ref 1.8–7.4)
NEUTROPHILS NFR BLD AUTO: 59.1 % — SIGNIFICANT CHANGE UP (ref 43–77)
NITRITE UR-MCNC: NEGATIVE — SIGNIFICANT CHANGE UP
NRBC # BLD: 0 /100 WBCS — SIGNIFICANT CHANGE UP
NRBC # FLD: 0 K/UL — SIGNIFICANT CHANGE UP
PCO2 BLDV: 53 MMHG — HIGH (ref 39–42)
PH BLDV: 7.34 — SIGNIFICANT CHANGE UP (ref 7.32–7.43)
PH UR: 5.5 — SIGNIFICANT CHANGE UP (ref 5–8)
PLATELET # BLD AUTO: 167 K/UL — SIGNIFICANT CHANGE UP (ref 150–400)
PO2 BLDV: 28 MMHG — SIGNIFICANT CHANGE UP
POTASSIUM BLDV-SCNC: 3.6 MMOL/L — SIGNIFICANT CHANGE UP (ref 3.5–5.1)
POTASSIUM SERPL-MCNC: 3.8 MMOL/L — SIGNIFICANT CHANGE UP (ref 3.5–5.3)
POTASSIUM SERPL-SCNC: 3.8 MMOL/L — SIGNIFICANT CHANGE UP (ref 3.5–5.3)
PROT SERPL-MCNC: 7.5 G/DL — SIGNIFICANT CHANGE UP (ref 6–8.3)
PROT UR-MCNC: NEGATIVE — SIGNIFICANT CHANGE UP
PROTHROM AB SERPL-ACNC: 13.3 SEC — SIGNIFICANT CHANGE UP (ref 10.5–13.4)
RBC # BLD: 4.17 M/UL — SIGNIFICANT CHANGE UP (ref 3.8–5.2)
RBC # FLD: 12.5 % — SIGNIFICANT CHANGE UP (ref 10.3–14.5)
RSV RNA NPH QL NAA+NON-PROBE: SIGNIFICANT CHANGE UP
SAO2 % BLDV: 51.5 % — SIGNIFICANT CHANGE UP
SARS-COV-2 RNA SPEC QL NAA+PROBE: SIGNIFICANT CHANGE UP
SODIUM SERPL-SCNC: 141 MMOL/L — SIGNIFICANT CHANGE UP (ref 135–145)
SP GR SPEC: 1 — SIGNIFICANT CHANGE UP (ref 1–1.05)
TROPONIN T, HIGH SENSITIVITY RESULT: 7 NG/L — SIGNIFICANT CHANGE UP
UROBILINOGEN FLD QL: SIGNIFICANT CHANGE UP
WBC # BLD: 4.05 K/UL — SIGNIFICANT CHANGE UP (ref 3.8–10.5)
WBC # FLD AUTO: 4.05 K/UL — SIGNIFICANT CHANGE UP (ref 3.8–10.5)

## 2022-03-14 PROCEDURE — 74176 CT ABD & PELVIS W/O CONTRAST: CPT | Mod: 26,MA

## 2022-03-14 PROCEDURE — 99285 EMERGENCY DEPT VISIT HI MDM: CPT | Mod: GC

## 2022-03-14 PROCEDURE — 71045 X-RAY EXAM CHEST 1 VIEW: CPT | Mod: 26

## 2022-03-14 PROCEDURE — 99223 1ST HOSP IP/OBS HIGH 75: CPT

## 2022-03-14 PROCEDURE — 70450 CT HEAD/BRAIN W/O DYE: CPT | Mod: 26,MA

## 2022-03-14 RX ORDER — ACETAMINOPHEN 500 MG
650 TABLET ORAL EVERY 6 HOURS
Refills: 0 | Status: DISCONTINUED | OUTPATIENT
Start: 2022-03-14 | End: 2022-03-16

## 2022-03-14 RX ORDER — ATORVASTATIN CALCIUM 80 MG/1
80 TABLET, FILM COATED ORAL AT BEDTIME
Refills: 0 | Status: DISCONTINUED | OUTPATIENT
Start: 2022-03-14 | End: 2022-03-16

## 2022-03-14 RX ORDER — CIPROFLOXACIN LACTATE 400MG/40ML
0 VIAL (ML) INTRAVENOUS
Qty: 0 | Refills: 0 | DISCHARGE

## 2022-03-14 RX ORDER — ENOXAPARIN SODIUM 100 MG/ML
40 INJECTION SUBCUTANEOUS EVERY 24 HOURS
Refills: 0 | Status: DISCONTINUED | OUTPATIENT
Start: 2022-03-14 | End: 2022-03-16

## 2022-03-14 NOTE — H&P ADULT - PROBLEM SELECTOR PLAN 1
Assessment:  - patient c/o R abdominal tenderness  - CT abd/pelvis shows unremarkable bowel however no contrast. Kidney shows a 4.6 cm cyst  - kidney cyst likely source of abdominal tenderness    Plan:  - urology consult for intervention of kidney cyst  - tylenol prn for pain  - will send stool culture and GI pcr for diarrhea work up, c diff due to hx of abx use. Although low suspicion for infection and likely due to OTC laxatives  - consider MRI abd/pelvis to evaluate malignancy of kidney cyst since the patient cannot tolerate IV contrast due to allergy Assessment:  - patient c/o R abdominal tenderness  - CT abd/pelvis shows unremarkable bowel however no contrast. Kidney shows a 4.6 cm cyst  - kidney cyst likely source of abdominal tenderness    Plan:  - urology consult for intervention of kidney cyst  ***update 10:55PM: spoke with urology, states nothing to do for cysts and her outpatient urologist knows about the cyst findings. Recommend to r/o with Dr Holland outpatient. Urology note to follow***  - tylenol prn for pain  - will send stool culture and GI pcr for diarrhea work up, c diff due to hx of abx use. Although low suspicion for infection and likely due to OTC laxatives  - consider MRI abd/pelvis to evaluate malignancy of kidney cyst since the patient cannot tolerate IV contrast due to allergy

## 2022-03-14 NOTE — H&P ADULT - PROBLEM SELECTOR PLAN 3
Assessment:  - hx of herniated disc  - could be the cause of her numbness however it is acute presentation    Plan:  - consider MRI of back if symptoms does not improve

## 2022-03-14 NOTE — ED ADULT NURSE REASSESSMENT NOTE - NS ED NURSE REASSESS COMMENT FT1
Received report from Day RN: Pt A&Ox4, no NAD or complaints, no facial droop, or neurological deficits, safety precautions implemented as per protocol, awaiting for further MD orders, will continue to monitor.

## 2022-03-14 NOTE — H&P ADULT - NSHPPHYSICALEXAM_GEN_ALL_CORE
PHYSICAL EXAM:  VITALS: Vital Signs Last 24 Hrs  T(C): 36.7 (14 Mar 2022 21:46), Max: 36.8 (14 Mar 2022 14:13)  T(F): 98.1 (14 Mar 2022 21:46), Max: 98.2 (14 Mar 2022 14:13)  HR: 63 (14 Mar 2022 21:46) (63 - 68)  BP: 135/76 (14 Mar 2022 21:46) (135/76 - 149/60)  BP(mean): --  RR: 18 (14 Mar 2022 21:46) (18 - 18)  SpO2: 100% (14 Mar 2022 21:46) (100% - 100%)  GENERAL: NAD, well-developed, well-nourished  HEAD:  Atraumatic, Normocephalic  EYES: EOMI, PERRL, conjunctiva and sclera clear  ENT: Moist Mucus Membranes present, no ulcers appreciated  NECK: Supple, No JVD  CHEST/LUNG: Clear to auscultation bilaterally; No wheezes, rales or rhonchi, no accessory muscle use  HEART: Regular rate and rhythm; No murmurs, rubs, or gallops, (+)S1, S2  ABDOMEN: Soft, + tenderness on the RUQ and R flank area, Nondistended; Normal Bowel sounds   EXTREMITIES:  2+ Peripheral Pulses, No clubbing, cyanosis, or edema  PSYCH: normal mood and affect  NEUROLOGY: AAOx3, non-focal, 5/5 strength in UE and LE b/l, sensation intact, no facial droop  SKIN: No rashes or lesions

## 2022-03-14 NOTE — H&P ADULT - NSHPLABSRESULTS_GEN_ALL_CORE
12.7   4.05  )-----------( 167      ( 14 Mar 2022 15:07 )             36.8       03-14    141  |  104  |  7   ----------------------------<  99  3.8   |  23  |  0.96    Ca    9.7      14 Mar 2022 15:07  Mg     1.80     03-14    TPro  7.5  /  Alb  4.6  /  TBili  0.4  /  DBili  x   /  AST  22  /  ALT  11  /  AlkPhos  67  03-14            PT/INR - ( 14 Mar 2022 15:07 )   PT: 13.3 sec;   INR: 1.14 ratio         PTT - ( 14 Mar 2022 15:07 )  PTT:31.8 sec    15:07 - VBG - pH: 7.34  | pCO2: 53    | pO2: 28    | Lactate: 1.0        Urinalysis Basic - ( 14 Mar 2022 15:07 )    Color: Colorless / Appearance: Clear / S.004 / pH: x  Gluc: x / Ketone: Negative  / Bili: Negative / Urobili: <2 mg/dL   Blood: x / Protein: Negative / Nitrite: Negative   Leuk Esterase: Negative / RBC: x / WBC x   Sq Epi: x / Non Sq Epi: x / Bacteria: x

## 2022-03-14 NOTE — ED PROVIDER NOTE - ATTENDING CONTRIBUTION TO CARE
80yo F ho htn presents with rigth facial and extremity numbness since 10am this morning. no assoc weakness, no facial droop. symptoms persisted so came to ed  also complains of several weeks of right sided mid back pain and 1 week of abdominal pain  pt was seen by her Gi for abd pain and was given cipro for her abdominal pain + having loose stools  neuro exam only significant for slight diminished sensation on right side, ambulating wihtout difficulty, speech clear  abd tender in rlq  pt with contrast intolerance, refusing contrast, will get noncon CT head and abdomen, labs, admit/cdu for MRI/MRA without contrast

## 2022-03-14 NOTE — ED PROVIDER NOTE - PROGRESS NOTE DETAILS
Silver Mendoza PGY2: Work up thus far not revealing for cause of pt's symptoms. Will admit for further evaluation of abd pain and sensory changes. Accepted for admission to Dr. Sanford.

## 2022-03-14 NOTE — H&P ADULT - NSICDXFAMILYHX_GEN_ALL_CORE_FT
FAMILY HISTORY:  Father  Still living? No  Family history of diabetes mellitus, Age at diagnosis: Age Unknown  Family history of glaucoma, Age at diagnosis: Age Unknown    Mother  Still living? No  Family history of hypertension, Age at diagnosis: Age Unknown  Family history of stroke (cerebrovascular), Age at diagnosis: Age Unknown

## 2022-03-14 NOTE — ED ADULT TRIAGE NOTE - CHIEF COMPLAINT QUOTE
c/o numbness to right side of the face onset this [place], also endorses c/p  midsternal that radiates to the back, PERRLA extremities are strong and equal, pt reports  sensation to left side of the face is sharper than that on the right, Code stroke called pt taken to CT for studies.

## 2022-03-14 NOTE — H&P ADULT - NSICDXPASTSURGICALHX_GEN_ALL_CORE_FT
PAST SURGICAL HISTORY:  H/O tubal ligation     History of cataract surgery b/l 2012    S/P bladder repair 2012    S/P breast biopsy, left 1962    S/P rhinoplasty 1993

## 2022-03-14 NOTE — ED PROVIDER NOTE - OBJECTIVE STATEMENT
80 yo F w/ PMHx of osteoporosis, herniated discs, bladder repair, GERD, presenting to ED for c/o of distal right arm and right leg tingling (worst at night), now progressing acutely to numbness in her right arm, right leg, and right face this morning at 10 AM. This was associated with acute onset of HA. She also reports right sided abd pain x 1 week and loose stool x 1 week - denies hematochezia or melena. No change in pain w/ eating. Pt seen by GI 1 week ago and prescribed ciprofloxacin w/ unclear indication. Denies dysuria/hematuria. Also reports right sided back/shoulder pain for "months". No hx of CVA, not on AC. No vision changes and no weakness in her extremities. Denies fevers. + allergy to contrast.

## 2022-03-14 NOTE — H&P ADULT - HISTORY OF PRESENT ILLNESS
This is an 82 y/o F with pmhx of osteoporosis, herniated disc, GERD, presented to the ED for multiple complaints. The patient states this morning 10AM she started to have R facial numbness and R hand numbness. The patient denies new focal deficits or facial drooping. Denies hx of stroke in the past. She also complains of R sided abdominal pain radiating to the R flank area for 1 week with associated loose stools, 2-3 times a day. Denies eating raw or restaurant food. She does endorse taking a laxative. She went to the GI doctor for evaluation and the GI doctor's work up was wnl, and was given cipro. The pain improved temporarily but got worse again. She also complains of R shoulder pain for which she had chronically for 3 months. States outpatient work up was negative. Denies recent hx of trauma.

## 2022-03-14 NOTE — H&P ADULT - NSHPREVIEWOFSYSTEMS_GEN_ALL_CORE
REVIEW OF SYSTEMS:    CONSTITUTIONAL: No weakness, fevers or chills  EYES/ENT: No visual changes;  No dysphagia; No sore throat; No rhinorrhea; No sinus pain/pressure  NECK: No pain or stiffness  RESPIRATORY: No cough, wheezing, hemoptysis; No shortness of breath  CARDIOVASCULAR: No chest pain or palpitations; No lower extremity edema  GASTROINTESTINAL: + abdominal pain on the R radiating to the R flank. No nausea, vomiting, or hematemesis; + loose stools, no constipation. No melena or hematochezia.  GENITOURINARY: No dysuria, frequency or hematuria  NEUROLOGICAL: + R face and R arm numbness  MSK: ambulates without assistance  SKIN: No itching, burning, rashes, or lesions   All other review of systems is negative unless indicated above.

## 2022-03-14 NOTE — H&P ADULT - PROBLEM SELECTOR PLAN 2
Assessment:  - patient reported c/o R arm and R facial numbness  - CT stroke protocol No acute hemorrhage mass or mass effect. limited because patient refused contrast  - no large and obvious focal deficits noted    Plan:  -  neurology consult: MRI of head w/o contrast, MRA head and neck no contrast, neuro checks, permissive HTN, ASA 81, statin 80mg, echo with bubble study  - PT/OT eval  - patient passed dysphagia screening, can start soft diet

## 2022-03-14 NOTE — ED PROVIDER NOTE - PHYSICAL EXAMINATION
Gen: A&Ox4   HEENT: Atraumatic. Mucous membranes moist, no scleral icterus.  CV: RRR. No significant lower extremity edema.   Resp: Respirations unlabored. CTAB, no rales, no wheezes.  GI: Abdomen non tender to palpation, soft and non-distended.   Skin/MSK: No open wounds. No ecchymosis appreciated.  Neuro: Following commands. No facial drop. CN2-12 grossly intact, although decreased sensation to light touch in V2 distribution. Motor strength +5/5 throughout upper and lower extremities. Sensation diminished in right arm, right leg, and right side of face, otherwise intact throughout upper and lower extremities. Finger to nose smooth and coordinated, heal to shin smooth and coordinated. No pronator drift. Gait stable and coordinated.  Psych: Appropriate mood, cooperative

## 2022-03-14 NOTE — CONSULT NOTE ADULT - ASSESSMENT
HPI: A 81 year unknown handed female with no significant known PMH has presented to the ED due to R sided facial numbess around R ear and V2 distribution and RLE (below knee) numbness since 10 AM today (LKW). Pt also admitted to RLE weakness. States that the symptoms have been intermittent. In addition pt has been having R sided back pain below shoulder blade and R sided groin pain since symptom onset. States her doctor has prescribed ciprofloxacin on Tuesday as her stomach has been upset. Pt does not take any medications at home. No hx of strokes, not on blood thinners. Pt denies any headache, nausea, vomiting, changes in vision or hearing, head trauma. Pt is not a TPA candidate as she is out side window. Pt is allergic to contrast.     NIHSS 2 (RLE drift, mild to moderate sensory)  preMRS 2     Neuro exam revealed RLE drift trace, R V2 and around R ear decreased to LT. CT head showed no acute changes.    Impression: R sided hemisensorymotor symptoms possibly 2/2 L sided brain dysfunction 2/2 possible stroke    Recommendations:  Core measures-  LDL: --  A1c: --  Statin: none at home  On Anticoagulation (for afib/aflutter)? none at home  On Antiplatelets? none at home    Recommendations:  -MRI brain w/o contrast, if no contraindications  -MRA head and neck w/o contrast only, pt allergic to contrast  -Neuro checks Q4  -Permissive HTN up to 220/110 for 24 hours from symptom onset, gradual normotension over 2-3 days  -PT/OT/Dysphagia screen  -Speech and swallow eval if dysphagia screen fails  -NPO except meds until dysphagia screen passed  -Head of bed > 30 degrees for aspiration prevention and aspiration precautions ordered.  -STAT CT head non-contrast for change in neuro exam.     Secondary prevention of stroke:  -Can start 81 mg ASA daily (rectal 325 mg ASA if dysphagia fails) for now, may discontinue if MRI negative.       -Atorvastatin 80 mg daily (long-term goal LDL < 70)  -Tight glucose control (long-term goal HgbA1c < 6%)    Stroke workup:  -TTE w/ bubble study  -Continuous  telemetry to monitor for arrhythmia  -Stroke labwork: HgbA1C, fasting lipid panel, CBC, CMP, coag pannel, troponin  - Baseline EKG    Prophylaxis: Lovenox 40 mg Sq daily unless contraindicated in which case SCDs   Fall precautions, aspiration precautions    Case to be discussed with stroke fellow Dr. Emile Crawford under supervision of attending Dr. Libman.    HPI: A 81 year unknown handed female with no significant known PMH has presented to the ED due to R sided facial numbess around R ear and V2 distribution and RLE (below knee) numbness since 10 AM today (LKW). Pt also admitted to RLE weakness. States that the symptoms have been intermittent. In addition pt has been having R sided back pain below shoulder blade and R sided groin pain since symptom onset. States her doctor has prescribed ciprofloxacin on Tuesday as her stomach has been upset. Pt does not take any medications at home. No hx of strokes, not on blood thinners. Pt denies any headache, nausea, vomiting, changes in vision or hearing, head trauma. Pt is not a TPA candidate as she is out side window. Pt is allergic to contrast.     NIHSS 2 (RLE drift, mild to moderate sensory in R face/ear, RUE and RLE)  preMRS 2     Neuro exam revealed RLE drift trace, R V2 and around R ear decreased to LT. CT head showed no acute changes.    Impression: R sided hemisensorymotor symptoms possibly 2/2 L sided brain dysfunction 2/2 possible stroke    Recommendations:  Core measures-  LDL: --  A1c: --  Statin: none at home  On Anticoagulation (for afib/aflutter)? none at home  On Antiplatelets? none at home    Recommendations:  -MRI brain w/o contrast, if no contraindications  -MRA head and neck w/o contrast only, pt allergic to contrast  -Neuro checks Q4  -Permissive HTN up to 220/110 for 24 hours from symptom onset, gradual normotension over 2-3 days  -PT/OT/Dysphagia screen  -Speech and swallow eval if dysphagia screen fails  -NPO except meds until dysphagia screen passed  -Head of bed > 30 degrees for aspiration prevention and aspiration precautions ordered.  -STAT CT head non-contrast for change in neuro exam.     Secondary prevention of stroke:  -Can start 81 mg ASA daily (rectal 325 mg ASA if dysphagia fails) for now, may discontinue if MRI negative.       -Atorvastatin 80 mg daily (long-term goal LDL < 70)  -Tight glucose control (long-term goal HgbA1c < 6%)    Stroke workup:  -TTE w/ bubble study  -Continuous  telemetry to monitor for arrhythmia  -Stroke labwork: HgbA1C, fasting lipid panel, CBC, CMP, coag pannel, troponin  - Baseline EKG    Prophylaxis: Lovenox 40 mg Sq daily unless contraindicated in which case SCDs   Fall precautions, aspiration precautions    Case to be discussed with stroke fellow Dr. Emile Crawford under supervision of attending Dr. Libman.

## 2022-03-14 NOTE — H&P ADULT - NSICDXPASTMEDICALHX_GEN_ALL_CORE_FT
PAST MEDICAL HISTORY:  Age related osteoporosis     Arthritis back    Bladder prolapse, female, acquired     Hay fever     Hiatal hernia

## 2022-03-14 NOTE — H&P ADULT - ASSESSMENT
82 y/o F with pmhx of osteoporosis, herniated disc, GERD, presented to the ED for multiple complaints: abdominal pain, R Arm numbness and shoulder pain. Admit for stroke work up and kidney cyst.

## 2022-03-14 NOTE — ED ADULT NURSE NOTE - OBJECTIVE STATEMENT
80 y/o female presents to ED with c/o numbness.  Pt states that she started to have numbness to right side of face and RLE, that started at 1000 today.  Pt awake alert in NAD, speech clear and fluent, no facial droop noted, pt ambulatory, gait steady without any assistance.  No focal neurologic deficit noted.  Pt also c/o pain to shoulder/mid back x few weeks and abd pain x 1 week.  Pt denies cough, fever, chills, no n/v/d.  Provider evaluation ongoing.

## 2022-03-14 NOTE — CONSULT NOTE ADULT - ATTENDING COMMENTS
80 y/o F with pmhx of osteoporosis, herniated disc, GERD, here with right sided facial and body numbness resoving. Now with just RLE numbness/aches Neuro exam non-focal NIHSS 0 CTH-    Suspect peripheral cause, less likely CVA  Can continue with above for now

## 2022-03-14 NOTE — CONSULT NOTE ADULT - SUBJECTIVE AND OBJECTIVE BOX
HPI: A 81 year unknown handed female with no significant known PMH has presented to the ED due to R sided facial numbess around R ear and V2 distribution and RLE (below knee) numbness since 10 AM today (LKW). Pt also admitted to RLE weakness. States that the symptoms have been intermittent. In addition pt has been having R sided back pain below shoulder blade and R sided groin pain since symptom onset. States her doctor has prescribed ciprofloxacin on Tuesday as her stomach has been upset. Pt does not take any medications at home. No hx of strokes, not on blood thinners. Pt denies any headache, nausea, vomiting, changes in vision or hearing, head trauma. Pt is not a TPA candidate as she is out side window. Pt is allergic to contrast    NIHSS 2 (RLE drift, mild to moderate sensory)  preMRS 2       PAST MEDICAL & SURGICAL HISTORY:  Hiatal hernia    Age related osteoporosis    Hay fever    Bladder prolapse, female, acquired    Arthritis  back    S/P breast biopsy, left  1962    S/P rhinoplasty  1993    H/O tubal ligation    History of cataract surgery  b/l 2012    S/P bladder repair  2012      FAMILY HISTORY:  Family history of hypertension (Mother)    Family history of stroke (cerebrovascular) (Mother)    Family history of diabetes mellitus (Father)    Family history of glaucoma (Father)        SOCIAL HISTORY: SOCIAL HISTORY:     As per HPI.    MEDICATIONS  Home Medications:  acetaminophen 325 mg oral tablet: 2 tab(s) orally every 6 hours, As needed, Moderate Pain (20 Jul 2015 10:23)  Alpha Lipoic Acid 100 mg oral tablet: 3 tab(s) orally once a day (20 Jul 2015 08:25)  Cipro 500 mg oral tablet: 1 tab(s) orally every 12 hours (20 Jul 2015 10:23)  citrus bioflavonoid complex: 700 milligram(s) orally once a day (20 Jul 2015 08:25)  Co Q-10 100 mg oral capsule: 1 cap(s) orally once a day (20 Jul 2015 08:25)  glucomannan fiber: 575 milligram(s) orally once a day (20 Jul 2015 08:25)  grape seed extract: 1 tab(s) orally once a day (20 Jul 2015 08:25)  lutein billberry: 1 tab(s) orally once a day (20 Jul 2015 08:25)  milk thistle oral tablet: 250 milligram(s) orally once a day (20 Jul 2015 08:25)  multiomega 3-6-9: 800 milligram(s) orally once a day (20 Jul 2015 08:25)  olive leaf extract: 1 tab(s) orally once a day (20 Jul 2015 08:25)  probiotic complex: 400 milligram(s) orally once a day (20 Jul 2015 08:25)  royal jelly: 1 tab(s) orally once a day (20 Jul 2015 08:25)  Vitamin C 1000 mg oral tablet: 2 tab(s) orally once a day (20 Jul 2015 08:25)  Vitamin D3 400 intl units oral tablet: 2 tab(s) orally once a day (20 Jul 2015 08:25)  vitamin E 400 intl units oral capsule: 1 cap(s) orally once a day (20 Jul 2015 08:25)      MEDICATIONS  (STANDING):    MEDICATIONS  (PRN):      ALLERGIES/INTOLERANCES:  Allergies  Advil (Hives)  aspirin (Hives)  codeine (Hives)  IV contast - tightness in chest (Unknown)  Motrin (Hives)    Intolerances      OBJECTIVE:  VITALS   Vital Signs Last 24 Hrs  T(C): 36.8 (14 Mar 2022 14:13), Max: 36.8 (14 Mar 2022 14:13)  T(F): 98.2 (14 Mar 2022 14:13), Max: 98.2 (14 Mar 2022 14:13)  HR: 68 (14 Mar 2022 14:13) (68 - 68)  BP: 146/86 (14 Mar 2022 14:13) (146/86 - 146/86)  BP(mean): --  RR: 18 (14 Mar 2022 14:13) (18 - 18)  SpO2: 100% (14 Mar 2022 14:13) (100% - 100%)    PHYSICAL EXAM:  Neurological Exam:  MS: Awake, alert, oriented to person, place, situation, time. Normal affect. Follows all commands.    Language: Speech is clear, fluent with good repetition & comprehension (able to name objects)    CNs: PERRLA (R = 3mm, L = 3mm). VFF. EOMI no nystagmus, no diplopia. R V2 and around R ear decreased to LT, well developed masseter muscles b/l. No facial asymmetry b/l, Hearing grossly normal (rubbing fingers) b/l. Symmetric palate elevation in midline. Gag reflex deferred. Head turning & shoulder shrug intact b/l. Tongue midline, normal movements, no atrophy.      Motor: Normal muscle bulk & tone. No noticeable tremor or seizure. No pronator drift.  Slight drift in RLE only     Sensation: Decreased sensation to light touch in RUE and RLE. Intact to temperature and vibration in all extremities.     Reflexes:              Biceps(C5)       BR(C6)     Triceps(C7)               Patellar(L4)    Achilles(S1)    Plantar Resp  R	2	          2	             2		        2		    2		Down   L	2	          2	             2		        2		    2		Down     Coordination: intact rapid-alt movements. No dysmetria to FTN/HTS    Gait:  deferred    LABORATORY:  CBC   Chem       LFTs   Coagulopathy   Lipid Panel   A1c   Cardiac enzymes     U/A   CSF  Immunological  Other    STUDIES & IMAGING:  Studies (EKG, EEG, EMG, etc):     Radiology (XR, CT, MR, U/S, TTE/CLINT):  CT head: No acute hemorrhage mass or mass effect. HPI: A 81 year unknown handed female with no significant known PMH has presented to the ED due to R sided facial numbess around R ear and V2 distribution and RLE (below knee) numbness since 10 AM today (LKW). Pt also admitted to RLE weakness. States that the symptoms have been intermittent. In addition pt has been having R sided back pain below shoulder blade and R sided groin pain since symptom onset. States her doctor has prescribed ciprofloxacin on Tuesday as her stomach has been upset. Pt does not take any medications at home. No hx of strokes, not on blood thinners. Pt denies any headache, nausea, vomiting, changes in vision or hearing, head trauma. Pt is not a TPA candidate as she is out side window. Pt is allergic to contrast    NIHSS 2 (RLE drift, mild to moderate sensory)  preMRS 2       PAST MEDICAL & SURGICAL HISTORY:  Hiatal hernia    Age related osteoporosis    Hay fever    Bladder prolapse, female, acquired    Arthritis  back    S/P breast biopsy, left  1962    S/P rhinoplasty  1993    H/O tubal ligation    History of cataract surgery  b/l 2012    S/P bladder repair  2012      FAMILY HISTORY:  Family history of hypertension (Mother)    Family history of stroke (cerebrovascular) (Mother)    Family history of diabetes mellitus (Father)    Family history of glaucoma (Father)        SOCIAL HISTORY: SOCIAL HISTORY:     As per HPI.    MEDICATIONS  Home Medications:  acetaminophen 325 mg oral tablet: 2 tab(s) orally every 6 hours, As needed, Moderate Pain (20 Jul 2015 10:23)  Alpha Lipoic Acid 100 mg oral tablet: 3 tab(s) orally once a day (20 Jul 2015 08:25)  Cipro 500 mg oral tablet: 1 tab(s) orally every 12 hours (20 Jul 2015 10:23)  citrus bioflavonoid complex: 700 milligram(s) orally once a day (20 Jul 2015 08:25)  Co Q-10 100 mg oral capsule: 1 cap(s) orally once a day (20 Jul 2015 08:25)  glucomannan fiber: 575 milligram(s) orally once a day (20 Jul 2015 08:25)  grape seed extract: 1 tab(s) orally once a day (20 Jul 2015 08:25)  lutein billberry: 1 tab(s) orally once a day (20 Jul 2015 08:25)  milk thistle oral tablet: 250 milligram(s) orally once a day (20 Jul 2015 08:25)  multiomega 3-6-9: 800 milligram(s) orally once a day (20 Jul 2015 08:25)  olive leaf extract: 1 tab(s) orally once a day (20 Jul 2015 08:25)  probiotic complex: 400 milligram(s) orally once a day (20 Jul 2015 08:25)  royal jelly: 1 tab(s) orally once a day (20 Jul 2015 08:25)  Vitamin C 1000 mg oral tablet: 2 tab(s) orally once a day (20 Jul 2015 08:25)  Vitamin D3 400 intl units oral tablet: 2 tab(s) orally once a day (20 Jul 2015 08:25)  vitamin E 400 intl units oral capsule: 1 cap(s) orally once a day (20 Jul 2015 08:25)      MEDICATIONS  (STANDING):    MEDICATIONS  (PRN):      ALLERGIES/INTOLERANCES:  Allergies  Advil (Hives)  aspirin (Hives)  codeine (Hives)  IV contast - tightness in chest (Unknown)  Motrin (Hives)    Intolerances      OBJECTIVE:  VITALS   Vital Signs Last 24 Hrs  T(C): 36.8 (14 Mar 2022 14:13), Max: 36.8 (14 Mar 2022 14:13)  T(F): 98.2 (14 Mar 2022 14:13), Max: 98.2 (14 Mar 2022 14:13)  HR: 68 (14 Mar 2022 14:13) (68 - 68)  BP: 146/86 (14 Mar 2022 14:13) (146/86 - 146/86)  BP(mean): --  RR: 18 (14 Mar 2022 14:13) (18 - 18)  SpO2: 100% (14 Mar 2022 14:13) (100% - 100%)    PHYSICAL EXAM:  Neurological Exam:  MS: Awake, alert, oriented to person, place, situation, time. Normal affect. Follows all commands.    Language: Speech is clear, fluent with good repetition & comprehension (able to name objects)    CNs: PERRLA (R = 3mm, L = 3mm). VFF. EOMI no nystagmus, no diplopia. R V2 and around R ear decreased to LT, well developed masseter muscles b/l. No facial asymmetry b/l, Hearing grossly normal (rubbing fingers) b/l. Symmetric palate elevation in midline. Gag reflex deferred. Head turning & shoulder shrug intact b/l. Tongue midline, normal movements, no atrophy.      Motor: Normal muscle bulk & tone. No noticeable tremor or seizure. No pronator drift.  Slight drift in RLE only     Sensation: Decreased sensation to light touch in RUE and RLE. Intact to temperature and vibration in all extremities.     Coordination: intact rapid-alt movements. No dysmetria to FTN/HTS    Gait:  deferred    LABORATORY:  CBC   Chem       LFTs   Coagulopathy   Lipid Panel   A1c   Cardiac enzymes     U/A   CSF  Immunological  Other    STUDIES & IMAGING:  Studies (EKG, EEG, EMG, etc):     Radiology (XR, CT, MR, U/S, TTE/CLINT):  CT head: No acute hemorrhage mass or mass effect. HPI: A 81 year unknown handed female with no significant known PMH has presented to the ED due to R sided facial numbess around R ear and V2 distribution and RLE (below knee) numbness since 10 AM today (LKW). Pt also admitted to RLE weakness. States that the symptoms have been intermittent. In addition pt has been having R sided back pain below shoulder blade and R sided groin pain since symptom onset. States her doctor has prescribed ciprofloxacin on Tuesday as her stomach has been upset. Pt does not take any medications at home. No hx of strokes, not on blood thinners. Pt denies any headache, nausea, vomiting, changes in vision or hearing, head trauma. Pt is not a TPA candidate as she is out side window. Pt is allergic to contrast    NIHSS 2 (RLE drift, mild to moderate sensory loss in R face/ear, RUE and RLE)  preMRS 2       PAST MEDICAL & SURGICAL HISTORY:  Hiatal hernia    Age related osteoporosis    Hay fever    Bladder prolapse, female, acquired    Arthritis  back    S/P breast biopsy, left  1962    S/P rhinoplasty  1993    H/O tubal ligation    History of cataract surgery  b/l 2012    S/P bladder repair  2012      FAMILY HISTORY:  Family history of hypertension (Mother)    Family history of stroke (cerebrovascular) (Mother)    Family history of diabetes mellitus (Father)    Family history of glaucoma (Father)        SOCIAL HISTORY: SOCIAL HISTORY:     As per HPI.    MEDICATIONS  Home Medications:  acetaminophen 325 mg oral tablet: 2 tab(s) orally every 6 hours, As needed, Moderate Pain (20 Jul 2015 10:23)  Alpha Lipoic Acid 100 mg oral tablet: 3 tab(s) orally once a day (20 Jul 2015 08:25)  Cipro 500 mg oral tablet: 1 tab(s) orally every 12 hours (20 Jul 2015 10:23)  citrus bioflavonoid complex: 700 milligram(s) orally once a day (20 Jul 2015 08:25)  Co Q-10 100 mg oral capsule: 1 cap(s) orally once a day (20 Jul 2015 08:25)  glucomannan fiber: 575 milligram(s) orally once a day (20 Jul 2015 08:25)  grape seed extract: 1 tab(s) orally once a day (20 Jul 2015 08:25)  lutein billberry: 1 tab(s) orally once a day (20 Jul 2015 08:25)  milk thistle oral tablet: 250 milligram(s) orally once a day (20 Jul 2015 08:25)  multiomega 3-6-9: 800 milligram(s) orally once a day (20 Jul 2015 08:25)  olive leaf extract: 1 tab(s) orally once a day (20 Jul 2015 08:25)  probiotic complex: 400 milligram(s) orally once a day (20 Jul 2015 08:25)  royal jelly: 1 tab(s) orally once a day (20 Jul 2015 08:25)  Vitamin C 1000 mg oral tablet: 2 tab(s) orally once a day (20 Jul 2015 08:25)  Vitamin D3 400 intl units oral tablet: 2 tab(s) orally once a day (20 Jul 2015 08:25)  vitamin E 400 intl units oral capsule: 1 cap(s) orally once a day (20 Jul 2015 08:25)      MEDICATIONS  (STANDING):    MEDICATIONS  (PRN):      ALLERGIES/INTOLERANCES:  Allergies  Advil (Hives)  aspirin (Hives)  codeine (Hives)  IV contast - tightness in chest (Unknown)  Motrin (Hives)    Intolerances      OBJECTIVE:  VITALS   Vital Signs Last 24 Hrs  T(C): 36.8 (14 Mar 2022 14:13), Max: 36.8 (14 Mar 2022 14:13)  T(F): 98.2 (14 Mar 2022 14:13), Max: 98.2 (14 Mar 2022 14:13)  HR: 68 (14 Mar 2022 14:13) (68 - 68)  BP: 146/86 (14 Mar 2022 14:13) (146/86 - 146/86)  BP(mean): --  RR: 18 (14 Mar 2022 14:13) (18 - 18)  SpO2: 100% (14 Mar 2022 14:13) (100% - 100%)    PHYSICAL EXAM:  Neurological Exam:  MS: Awake, alert, oriented to person, place, situation, time. Normal affect. Follows all commands.    Language: Speech is clear, fluent with good repetition & comprehension (able to name objects)    CNs: PERRLA (R = 3mm, L = 3mm). VFF. EOMI no nystagmus, no diplopia. R V2 and around R ear decreased to LT, well developed masseter muscles b/l. No facial asymmetry b/l, Hearing grossly normal (rubbing fingers) b/l. Symmetric palate elevation in midline. Gag reflex deferred. Head turning & shoulder shrug intact b/l. Tongue midline, normal movements, no atrophy.      Motor: Normal muscle bulk & tone. No noticeable tremor or seizure. No pronator drift.  Slight drift in RLE only     Sensation: Decreased sensation to light touch in RUE and RLE. Intact to temperature and vibration in all extremities.     Coordination: intact rapid-alt movements. No dysmetria to FTN/HTS    Gait:  deferred    LABORATORY:  CBC   Chem       LFTs   Coagulopathy   Lipid Panel   A1c   Cardiac enzymes     U/A   CSF  Immunological  Other    STUDIES & IMAGING:  Studies (EKG, EEG, EMG, etc):     Radiology (XR, CT, MR, U/S, TTE/CLINT):  CT head: No acute hemorrhage mass or mass effect.

## 2022-03-14 NOTE — ED PROVIDER NOTE - CLINICAL SUMMARY MEDICAL DECISION MAKING FREE TEXT BOX
82 yo F w/ PMHx of osteoporosis, herniated discs, bladder repair, GERD, presenting to ED for c/o of distal right arm and right leg tingling (worst at night), now progressing acutely to numbness in her right arm, right leg, and right face this morning at 10 AM. This was associated with acute onset of HA. She also reports right sided abd pain x 1 week and loose stool x 1 week - denies hematochezia or melena. No change in pain w/ eating. Pt seen by GI 1 week ago and prescribed ciprofloxacin w/ unclear indication. Denies dysuria/hematuria. Also reports right sided back/shoulder pain for "months". No hx of CVA, not on AC. No vision changes and no weakness in her extremities. Denies fevers. + allergy to contrast. Exam as above. Concern for CVA, ICH, electrolyte abnormality, spinal stenosis, diverticulitis, colitis, gastroenteritis. less likely gallbladder pathology. Labs, stroke eval, CT abd, will reassess. Outside window for TPA. Neuro at bedside.

## 2022-03-15 LAB
C DIFF BY PCR RESULT: SIGNIFICANT CHANGE UP
C DIFF TOX GENS STL QL NAA+PROBE: SIGNIFICANT CHANGE UP
CULTURE RESULTS: NO GROWTH — SIGNIFICANT CHANGE UP
CULTURE RESULTS: SIGNIFICANT CHANGE UP
SPECIMEN SOURCE: SIGNIFICANT CHANGE UP
SPECIMEN SOURCE: SIGNIFICANT CHANGE UP

## 2022-03-15 PROCEDURE — 70547 MR ANGIOGRAPHY NECK W/O DYE: CPT | Mod: 26

## 2022-03-15 PROCEDURE — 70544 MR ANGIOGRAPHY HEAD W/O DYE: CPT | Mod: 26,59

## 2022-03-15 PROCEDURE — 93306 TTE W/DOPPLER COMPLETE: CPT | Mod: 26

## 2022-03-15 PROCEDURE — 70551 MRI BRAIN STEM W/O DYE: CPT | Mod: 26

## 2022-03-15 RX ADMIN — ENOXAPARIN SODIUM 40 MILLIGRAM(S): 100 INJECTION SUBCUTANEOUS at 12:07

## 2022-03-15 RX ADMIN — ATORVASTATIN CALCIUM 80 MILLIGRAM(S): 80 TABLET, FILM COATED ORAL at 22:11

## 2022-03-15 NOTE — PHYSICAL THERAPY INITIAL EVALUATION ADULT - PATIENT PROFILE REVIEW, REHAB EVAL
PT orders received: out of bed with assistance. Consult with RN Estefany ASH, patient may participate in PT evaluation./yes

## 2022-03-15 NOTE — PHYSICAL THERAPY INITIAL EVALUATION ADULT - PERTINENT HX OF CURRENT PROBLEM, REHAB EVAL
Pt is an 81 year old female presenting with abdominal pain, right arm numbness and shoulder pain. Admit for stroke work up and kidney cyst. PMH listed below.

## 2022-03-15 NOTE — PATIENT PROFILE ADULT - FALL HARM RISK - HARM RISK INTERVENTIONS

## 2022-03-15 NOTE — ED ADULT NURSE REASSESSMENT NOTE - NS ED NURSE REASSESS COMMENT FT1
Pt is alert and awake, no NAD or complaints, respirations are even and unlabored, report given to floor RN, awaiting transport, safety precautions implemented, will continue to monitor.

## 2022-03-15 NOTE — PHYSICAL THERAPY INITIAL EVALUATION ADULT - PRECAUTIONS/LIMITATIONS, REHAB EVAL
Elevate HOB/aspiration precautions/fall precautions/seizure precautions Elevate HOB/aspiration precautions/fall precautions/isolation precautions/seizure precautions

## 2022-03-15 NOTE — OCCUPATIONAL THERAPY INITIAL EVALUATION ADULT - PERTINENT HX OF CURRENT PROBLEM, REHAB EVAL
Pt is an 81 year old female with hx of osteoporosis, herniated disc, & GERD, who presented to [facility] on 3/14/22 with Right facial numbness and Right hand numbness along with Right abdominal pain radiating to the Right flank area for 1 week with associated loose stools, 2-3 times a day. CT Brain Scan on 3/14/22 displayed no acute hemorrhage mass or mass effect. Pt admitted for stroke work up and kidney cyst.

## 2022-03-15 NOTE — PHYSICAL THERAPY INITIAL EVALUATION ADULT - ADDITIONAL COMMENTS
Pt lives in a private house with 1 exterior step to enter, + flight of stairs within home. Prior to admission, pt ambulated independently, no assistive device.

## 2022-03-15 NOTE — PHYSICAL THERAPY INITIAL EVALUATION ADULT - GENERAL OBSERVATIONS, REHAB EVAL
Pt received semisupine in bed, +telemetry, in NAD. Pt agreeable to participate in PT evaluation. Pt left semisupine in bed as found, all lines intact and RN aware.

## 2022-03-15 NOTE — PROGRESS NOTE ADULT - PROBLEM SELECTOR PLAN 1
Assessment:  - patient c/o R abdominal tenderness  - CT abd/pelvis shows unremarkable bowel however no contrast. Kidney shows a 4.6 cm cyst  - kidney cyst likely source of abdominal tenderness    - urology f/u

## 2022-03-15 NOTE — PATIENT PROFILE ADULT - NSPROGENSOURCEINFO_GEN_A_NUR
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  NO  complains of  Latex allergy or sensitivity  Patient would like communication of their results via:    Cell Phone:   Telephone Information:   Mobile [phone]     Okay to leave a message containing results? Yes  Tobacco history: verified  Advanced directives: No        Patient's current [facility] status: Inactive - Patient declined.  There are no preventive care reminders to display for this patient.  Patient is up to date, no discussion needed..        [id number] [address] AT [place] [address] [place] [address]  Phone: [phone] Fax: [phone]    .             patient

## 2022-03-15 NOTE — OCCUPATIONAL THERAPY INITIAL EVALUATION ADULT - LIVES WITH, PROFILE
Pt. reports she lives with her  in a house with 1 step to enter. Once inside, pt. reports she has full flight of steps to negotiate to 2nd floor where main bedroom and bathroom are located. Per pt., she has a bathtub in her bathroom.

## 2022-03-15 NOTE — OCCUPATIONAL THERAPY INITIAL EVALUATION ADULT - MD ORDER
Occupational Therapy (OT) to evaluate and treat. Occupational Therapy (OT) to evaluate and treat. Out of bed with assistance. Per PERCY Allison, pt is okay to participate in OT evaluation and perform activity as tolerated.

## 2022-03-15 NOTE — PROGRESS NOTE ADULT - PROBLEM SELECTOR PLAN 3
Assessment:  - hx of herniated disc  - could be the cause of her numbness however it is acute presentation  - f/u MRI

## 2022-03-15 NOTE — OCCUPATIONAL THERAPY INITIAL EVALUATION ADULT - GENERAL OBSERVATIONS, REHAB EVAL
none Pt. received semisupine in bed. No acute distress. Patient agreed to evaluation from Occupational Therapist. +Heplock, +Tele, +Pulse ox to Left finger.

## 2022-03-15 NOTE — CHART NOTE - NSCHARTNOTEFT_GEN_A_CORE
80 y/o F with pmhx of osteoporosis, herniated disc, GERD, presented to the ED for multiple complaints: abdominal pain, R Arm numbness and shoulder pain, pt reports headache to subside after eating lunch . Pt for MRI angio of neck /head , MRI of head , safety check list in chart, patient is able to sign the consent.     Amy Amos, NP- C  Department of Medicine
81 y.o female has a right renal cyst for many years  requiring monitoring. Patient is having pain in the right shoulder  and lower back radiating to the right lower quadrant, no specific right flank pain. She is managed by private urologist Dr Stan Sprague.
80 y/o F with pmhx of osteoporosis, herniated disc, GERD, presented to the ED for multiple complaints: abdominal pain, R Arm numbness and shoulder pain, notified by RN c/o headache , Tylenol 650 mg offered to pt , pt refused, BP checked - 151/67,Hr- 82, will monitor patient .     Amy Amos, NP- C  Department of Medicine

## 2022-03-16 ENCOUNTER — TRANSCRIPTION ENCOUNTER (OUTPATIENT)
Age: 82
End: 2022-03-16

## 2022-03-16 VITALS
HEART RATE: 76 BPM | OXYGEN SATURATION: 100 % | DIASTOLIC BLOOD PRESSURE: 64 MMHG | RESPIRATION RATE: 18 BRPM | SYSTOLIC BLOOD PRESSURE: 115 MMHG | TEMPERATURE: 98 F

## 2022-03-16 LAB
ALBUMIN SERPL ELPH-MCNC: 4.4 G/DL — SIGNIFICANT CHANGE UP (ref 3.3–5)
ALP SERPL-CCNC: 67 U/L — SIGNIFICANT CHANGE UP (ref 40–120)
ALT FLD-CCNC: 9 U/L — SIGNIFICANT CHANGE UP (ref 4–33)
ANION GAP SERPL CALC-SCNC: 11 MMOL/L — SIGNIFICANT CHANGE UP (ref 7–14)
APTT BLD: 35 SEC — SIGNIFICANT CHANGE UP (ref 27–36.3)
AST SERPL-CCNC: 15 U/L — SIGNIFICANT CHANGE UP (ref 4–32)
BASOPHILS # BLD AUTO: 0.01 K/UL — SIGNIFICANT CHANGE UP (ref 0–0.2)
BASOPHILS NFR BLD AUTO: 0.4 % — SIGNIFICANT CHANGE UP (ref 0–2)
BILIRUB SERPL-MCNC: 0.6 MG/DL — SIGNIFICANT CHANGE UP (ref 0.2–1.2)
BUN SERPL-MCNC: 9 MG/DL — SIGNIFICANT CHANGE UP (ref 7–23)
CALCIUM SERPL-MCNC: 9.5 MG/DL — SIGNIFICANT CHANGE UP (ref 8.4–10.5)
CHLORIDE SERPL-SCNC: 102 MMOL/L — SIGNIFICANT CHANGE UP (ref 98–107)
CO2 SERPL-SCNC: 26 MMOL/L — SIGNIFICANT CHANGE UP (ref 22–31)
CREAT SERPL-MCNC: 1.01 MG/DL — SIGNIFICANT CHANGE UP (ref 0.5–1.3)
EGFR: 56 ML/MIN/1.73M2 — LOW
EOSINOPHIL # BLD AUTO: 0.08 K/UL — SIGNIFICANT CHANGE UP (ref 0–0.5)
EOSINOPHIL NFR BLD AUTO: 3.4 % — SIGNIFICANT CHANGE UP (ref 0–6)
GLUCOSE SERPL-MCNC: 90 MG/DL — SIGNIFICANT CHANGE UP (ref 70–99)
HCT VFR BLD CALC: 38.8 % — SIGNIFICANT CHANGE UP (ref 34.5–45)
HGB BLD-MCNC: 13 G/DL — SIGNIFICANT CHANGE UP (ref 11.5–15.5)
IANC: 1.28 K/UL — LOW (ref 1.5–8.5)
IMM GRANULOCYTES NFR BLD AUTO: 0 % — SIGNIFICANT CHANGE UP (ref 0–1.5)
LYMPHOCYTES # BLD AUTO: 0.65 K/UL — LOW (ref 1–3.3)
LYMPHOCYTES # BLD AUTO: 27.5 % — SIGNIFICANT CHANGE UP (ref 13–44)
MAGNESIUM SERPL-MCNC: 2 MG/DL — SIGNIFICANT CHANGE UP (ref 1.6–2.6)
MCHC RBC-ENTMCNC: 29.5 PG — SIGNIFICANT CHANGE UP (ref 27–34)
MCHC RBC-ENTMCNC: 33.5 GM/DL — SIGNIFICANT CHANGE UP (ref 32–36)
MCV RBC AUTO: 88 FL — SIGNIFICANT CHANGE UP (ref 80–100)
MONOCYTES # BLD AUTO: 0.34 K/UL — SIGNIFICANT CHANGE UP (ref 0–0.9)
MONOCYTES NFR BLD AUTO: 14.4 % — HIGH (ref 2–14)
NEUTROPHILS # BLD AUTO: 1.28 K/UL — LOW (ref 1.8–7.4)
NEUTROPHILS NFR BLD AUTO: 54.3 % — SIGNIFICANT CHANGE UP (ref 43–77)
NRBC # BLD: 0 /100 WBCS — SIGNIFICANT CHANGE UP
NRBC # FLD: 0 K/UL — SIGNIFICANT CHANGE UP
PHOSPHATE SERPL-MCNC: 3.1 MG/DL — SIGNIFICANT CHANGE UP (ref 2.5–4.5)
PLATELET # BLD AUTO: 165 K/UL — SIGNIFICANT CHANGE UP (ref 150–400)
POTASSIUM SERPL-MCNC: 3.7 MMOL/L — SIGNIFICANT CHANGE UP (ref 3.5–5.3)
POTASSIUM SERPL-SCNC: 3.7 MMOL/L — SIGNIFICANT CHANGE UP (ref 3.5–5.3)
PROT SERPL-MCNC: 7.1 G/DL — SIGNIFICANT CHANGE UP (ref 6–8.3)
RBC # BLD: 4.41 M/UL — SIGNIFICANT CHANGE UP (ref 3.8–5.2)
RBC # FLD: 12.4 % — SIGNIFICANT CHANGE UP (ref 10.3–14.5)
SODIUM SERPL-SCNC: 139 MMOL/L — SIGNIFICANT CHANGE UP (ref 135–145)
WBC # BLD: 2.36 K/UL — LOW (ref 3.8–10.5)
WBC # FLD AUTO: 2.36 K/UL — LOW (ref 3.8–10.5)

## 2022-03-16 RX ORDER — ACETAMINOPHEN 500 MG
2 TABLET ORAL
Qty: 0 | Refills: 0 | DISCHARGE
Start: 2022-03-16

## 2022-03-16 RX ORDER — ATORVASTATIN CALCIUM 80 MG/1
1 TABLET, FILM COATED ORAL
Qty: 30 | Refills: 0
Start: 2022-03-16 | End: 2022-04-14

## 2022-03-16 NOTE — DISCHARGE NOTE PROVIDER - HOSPITAL COURSE
82 y/o F with pmhx of osteoporosis, herniated disc, GERD, presented to Emergency Room on  for multiple complaints of right sided abdominal pain and right side face and right arm numbness and right lower extremity numbness and weakness and right shoulder pain. Stroke code was called, CT protocol started,  CT of head was done , negative , but limited studies , no contrast done due to patient's allergy .No stroke was found. Patient passed dysphagia screen and tolerated soft diet. TTE bubble studies negative. Abdominal / pelvis CT done , right kidney cyst seen , to follow up with Dr. Holland outpatient (  Urology ) , as per urology abdominal pain is mostly due to kidney cyst. Work up[for diarrhea showed negative culture for c. diff, suggesting diarrhea most likely due to laxatives. MRI Head w/ contrast MRA head and neck showed no occlusion or stenosis.  Numbness resolved and was most likely due to hx of herniated disc .     Case discussed with Dr. Carter, Attending and patient is stable condition to discharge home.

## 2022-03-16 NOTE — DISCHARGE NOTE PROVIDER - CARE PROVIDERS DIRECT ADDRESSES
,[email or web address]_[email or web address]

## 2022-03-16 NOTE — DISCHARGE NOTE PROVIDER - NSDCMRMEDTOKEN_GEN_ALL_CORE_FT
acetaminophen 325 mg oral tablet: 2 tab(s) orally every 6 hours, As needed, Mild Pain (1 - 3)  Alpha Lipoic Acid 100 mg oral tablet: 3 tab(s) orally once a day  atorvastatin 80 mg oral tablet: 1 tab(s) orally once a day (at bedtime)  Cipro 500 mg oral tablet: 1 tab(s) orally every 12 hours  citrus bioflavonoid complex: 700 milligram(s) orally once a day  Co Q-10 100 mg oral capsule: 1 cap(s) orally once a day  glucomannan fiber: 575 milligram(s) orally once a day  grape seed extract: 1 tab(s) orally once a day  lutein billberry: 1 tab(s) orally once a day  milk thistle oral tablet: 250 milligram(s) orally once a day  multiomega 3-6-9: 800 milligram(s) orally once a day  olive leaf extract: 1 tab(s) orally once a day  probiotic complex: 400 milligram(s) orally once a day  royal jelly: 1 tab(s) orally once a day  Vitamin C 1000 mg oral tablet: 2 tab(s) orally once a day  Vitamin D3 400 intl units oral tablet: 2 tab(s) orally once a day  vitamin E 400 intl units oral capsule: 1 cap(s) orally once a day

## 2022-03-16 NOTE — DISCHARGE NOTE PROVIDER - NSDCCPCAREPLAN_GEN_ALL_CORE_FT
PRINCIPAL DISCHARGE DIAGNOSIS  Diagnosis: Numbness and tingling  Assessment and Plan of Treatment: You were presented with numbness and weakness of right arm and right face numbness that resolved. CT of head , MRI /MRA of head and neck done and did not show any occlusion or stenosis, Echo with bubbles was done and was negative . You were evaluated for stroke but no stroke was found. is showed right kidney cyst . Please follow up with Urology Dr. Holland and your Primary Doctor . Return to Emergency Room RIGHT NOW  if any weakness, numbness, chest pain , SOB, dizziness .      SECONDARY DISCHARGE DIAGNOSES  Diagnosis: Right arm numbness  Assessment and Plan of Treatment: Your numbness resolved. Please follw up with your Primary doctor .Return to Emergency Room RIGHT NOW  if any weakness or numbness    Diagnosis: Cyst of right kidney  Assessment and Plan of Treatment: CT of abndomen  / pelvis showed right kidney cyst that was most likely causing pain of your abdomen. Please Folllow up with your Urology for more work up    Diagnosis: History of herniated intervertebral disc  Assessment and Plan of Treatment: Please follw up with your Primary doctor , consider MRI of back and Return to Emergency Room RIGHT NOW  if any weakness or numbness of lower extremity    Diagnosis: Osteoporosis  Assessment and Plan of Treatment: Daniel follow up with your [facility] doctor for monitoring     PRINCIPAL DISCHARGE DIAGNOSIS  Diagnosis: Numbness and tingling  Assessment and Plan of Treatment: You were presented with numbness and weakness of right arm and right face numbness that resolved. CT of head , MRI /MRA of head and neck done and did not show any occlusion or stenosis, Echo with bubbles was done and was negative . You were evaluated for stroke but no stroke was found. is showed right kidney cyst . Please follow up with Urology Dr. Holland and your Primary Doctor . Return to Emergency Room RIGHT NOW  if any weakness, numbness, chest pain , SOB, dizziness .      SECONDARY DISCHARGE DIAGNOSES  Diagnosis: Right arm numbness  Assessment and Plan of Treatment: Your numbness resolved. Please follw up with your Primary doctor .Return to Emergency Room RIGHT NOW  if any weakness or numbness. Follow up with your Neurology doctor    Diagnosis: Cyst of right kidney  Assessment and Plan of Treatment: CT of abndomen  / pelvis showed right kidney cyst that was most likely causing pain of your abdomen. Please Folllow up with your Urology for more work up    Diagnosis: History of herniated intervertebral disc  Assessment and Plan of Treatment: Please follw up with your Primary doctor , consider MRI of back and Return to Emergency Room RIGHT NOW  if any weakness or numbness of lower extremity. Your numbness resolved. Please follw up with your Primary doctor .Return to Emergency Room RIGHT NOW  if any weakness or numbness. Follow up with your Neurology doctor    Diagnosis: Osteoporosis  Assessment and Plan of Treatment: [facility]     PRINCIPAL DISCHARGE DIAGNOSIS  Diagnosis: Numbness and tingling  Assessment and Plan of Treatment: You were presented with numbness and weakness of right arm and right face numbness that resolved. You had CT scans and MRIs of your head which did not show stroke. A kdiney cyst was noted, please follow up with Urology Dr. Holland and your Primary Doctor . Return to Emergency Room if you develop any new or worsening weakness, numbness, chest pain , SOB, dizziness .      SECONDARY DISCHARGE DIAGNOSES  Diagnosis: Cyst of right kidney  Assessment and Plan of Treatment: CT of abndomen  and pelvis showed right kidney cyst that was most likely causing pain of your abdomen. This has been evaluated in the past. Please Folllow up with your Urology for more work up    Diagnosis: Right arm numbness  Assessment and Plan of Treatment: Your numbness resolved. Please follw up with your Primary doctor .    Diagnosis: Osteoporosis  Assessment and Plan of Treatment: [facility]    Diagnosis: History of herniated intervertebral disc  Assessment and Plan of Treatment: Please follw up with your Primary doctor and if symptoms dont improve may benefit from an MRI of your back. Follow up with your Neurology doctor

## 2022-03-16 NOTE — PROGRESS NOTE ADULT - SUBJECTIVE AND OBJECTIVE BOX
Neurology Follow up note    Name  FRANCIA DE JESUS    HPI:  This is an 82 y/o F with pmhx of osteoporosis, herniated disc, GERD, presented to the ED for multiple complaints. The patient states this morning 10AM she started to have R facial numbness and R hand numbness. The patient denies new focal deficits or facial drooping. Denies hx of stroke in the past. She also complains of R sided abdominal pain radiating to the R flank area for 1 week with associated loose stools, 2-3 times a day. Denies eating raw or restaurant food. She does endorse taking a laxative. She went to the GI doctor for evaluation and the GI doctor's work up was wnl, and was given cipro. The pain improved temporarily but got worse again. She also complains of R shoulder pain for which she had chronically for 3 months. States outpatient work up was negative. Denies recent hx of trauma. (14 Mar 2022 22:18)      Interval History - Patient seen and examined this am.             Vital Signs Last 24 Hrs  T(C): 36.4 (16 Mar 2022 09:00), Max: 36.7 (15 Mar 2022 21:15)  T(F): 97.5 (16 Mar 2022 09:00), Max: 98 (15 Mar 2022 21:15)  HR: 70 (16 Mar 2022 09:00) (64 - 82)  BP: 120/72 (16 Mar 2022 09:00) (120/72 - 154/81)  BP(mean): --  RR: 18 (16 Mar 2022 09:00) (17 - 18)  SpO2: 100% (16 Mar 2022 09:00) (100% - 100%)    PHYSICAL EXAM:          Neurological Exam:  Mental Status - Patient is alert, awake, oriented X3. fluent, names, no dysarthria no aphasia Follows commands well and able to answer questions appropriately. Mood and affect  normal    Cranial Nerves - PERRL, EOMI, VFF, V1-V3 intact, no gross facial asymmetry, tongue/uvula midline    Motor Exam -   Right upper 5/5   Left upper 5/5  Right lower 5/5  Left lower 5/5     nml bulk/tone    Sensory    Intact to light touch and pinprick bilaterally    Coord: FTN intact bilaterally     Gait -  normal      Reflexes:          2+ throughout          Medications  acetaminophen     Tablet .. 650 milliGRAM(s) Oral every 6 hours PRN  atorvastatin 80 milliGRAM(s) Oral at bedtime  enoxaparin Injectable 40 milliGRAM(s) SubCutaneous every 24 hours      Lab      Radiology    < from: MR Angio Neck No Cont (03.15.22 @ 18:56) >  IMPRESSION:    MRI BRAIN: No acute intracranial hemorrhage or evidence of acute ischemia.    Multiple nonspecific abnormal white matter foci of T2/FLAIR prolongation   statistically favoring microvascular disease.    MRA NECK AND HEAD: No large vessel occlusion or major stenosis.    --- End of Report ---    < end of copied text >  
    SUBJECTIVE / OVERNIGHT EVENTS:pt seen and examined  03-15-22     MEDICATIONS  (STANDING):  atorvastatin 80 milliGRAM(s) Oral at bedtime  enoxaparin Injectable 40 milliGRAM(s) SubCutaneous every 24 hours    MEDICATIONS  (PRN):  acetaminophen     Tablet .. 650 milliGRAM(s) Oral every 6 hours PRN Mild Pain (1 - 3)    T(C): 36.4 (03-15-22 @ 13:39), Max: 36.6 (03-15-22 @ 07:20)  HR: 82 (03-15-22 @ 13:39) (63 - 82)  BP: 151/67 (03-15-22 @ 13:39) (135/66 - 151/67)  RR: 18 (03-15-22 @ 13:39) (17 - 18)  SpO2: 100% (03-15-22 @ 13:39) (100% - 100%)    CAPILLARY BLOOD GLUCOSE        I&O's Summary      Constitutional: No fever, fatigue  Skin: No rash.  Eyes: No recent vision problems or eye pain.  ENT: No congestion, ear pain, or sore throat.  Cardiovascular: No chest pain or palpation.  Respiratory: No cough, shortness of breath, congestion, or wheezing.  Gastrointestinal: No abdominal pain, nausea, vomiting, or diarrhea.  Genitourinary: No dysuria.  Musculoskeletal: No joint swelling.  Neurologic: No headache.    PHYSICAL EXAM:  GENERAL: NAD  EYES: EOMI, PERRLA  NECK: Supple, No JVD  CHEST/LUNG: cta marcin  HEART:  S1 , S2 +  ABDOMEN: soft , bs+  EXTREMITIES:  no edema  NEUROLOGY:alert awake      LABS:                        12.7   4.05  )-----------( 167      ( 14 Mar 2022 15:07 )             36.8     -    141  |  104  |  7   ----------------------------<  99  3.8   |  23  |  0.96    Ca    9.7      14 Mar 2022 15:07  Mg     1.80     -    TPro  7.5  /  Alb  4.6  /  TBili  0.4  /  DBili  x   /  AST  22  /  ALT  11  /  AlkPhos  67  -14    PT/INR - ( 14 Mar 2022 15:07 )   PT: 13.3 sec;   INR: 1.14 ratio         PTT - ( 14 Mar 2022 15:07 )  PTT:31.8 sec      Urinalysis Basic - ( 14 Mar 2022 15:07 )    Color: Colorless / Appearance: Clear / S.004 / pH: x  Gluc: x / Ketone: Negative  / Bili: Negative / Urobili: <2 mg/dL   Blood: x / Protein: Negative / Nitrite: Negative   Leuk Esterase: Negative / RBC: x / WBC x   Sq Epi: x / Non Sq Epi: x / Bacteria: x        GI PCR Panel, Stool (collected 03-15-22 @ 11:40)  Source: .Stool Feces  Final Report (03-15-22 @ 20:51):    GI PCR Results: NOT detected    *******Please Note:*******    GI panel PCR evaluates for:    Campylobacter, Plesiomonas shigelloides, Salmonella,    Vibrio, Yersinia enterocolitica, Enteroaggregative    Escherichia coli (EAEC), Enteropathogenic E.coli (EPEC),    Enterotoxigenic E. coli (ETEC) lt/st, Shiga-like    toxin-producing E. coli (STEC) stx1/stx2,    Shigella/ Enteroinvasive E. coli (EIEC), Cryptosporidium,    Cyclospora cayetanensis, Entamoeba histolytica,    Giardia lamblia, Adenovirus F 40/41, Astrovirus,    Norovirus GI/GII, Rotavirus A, Sapovirus      RADIOLOGY & ADDITIONAL TESTS:    Imaging Personally Reviewed:    Consultant(s) Notes Reviewed:      Care Discussed with Consultants/Other Providers:

## 2022-03-16 NOTE — DISCHARGE NOTE PROVIDER - CARE PROVIDER_API CALL
Stan Holland  UROLOGY  [address] [place] [address]  Phone: [phone]  Fax: [phone]  Established Patient  Follow Up Time:     Desmond Reveles  INTERNAL MEDICINE  [address] [facility], [address]  [place] [address]  Phone: [phone]  Fax: [phone]  Established Patient  Follow Up Time:

## 2022-03-16 NOTE — DISCHARGE NOTE NURSING/CASE MANAGEMENT/SOCIAL WORK - PATIENT PORTAL LINK FT
You can access the FollowMyHealth Patient Portal offered by [facility] by registering at the following website: [email or web address]. By joining [employer]’s FollowMyHealth portal, you will also be able to view your health information using other applications (apps) compatible with our system.

## 2022-03-16 NOTE — DISCHARGE NOTE NURSING/CASE MANAGEMENT/SOCIAL WORK - NSDCPEFALRISK_GEN_ALL_CORE
For information on Fall & Injury Prevention, visit: [email or web address] OR  [email or web address] OR  https://www.cdc.gov/steadi/patient.html

## 2022-03-16 NOTE — PROGRESS NOTE ADULT - ASSESSMENT
82 y/o F with pmhx of osteoporosis, herniated disc, GERD, here with right sided facial and body numbness resoving. Now with just RLE numbness/aches Neuro exam non-focal NIHSS 0 CTH- MRTI MRA H/N negative    Impression: lower suspicion for TIA suspect likely secondary to nown spinal disease now improved    -No further inpatient Neurologic workup at this time  -Follow up with her Neurologist
 82 y/o F with pmhx of osteoporosis, herniated disc, GERD, presented to the ED for multiple complaints: abdominal pain, R Arm numbness and shoulder pain. Admit for stroke work up and kidney cyst.

## 2022-03-16 NOTE — DISCHARGE NOTE PROVIDER - PROVIDER TOKENS
PROVIDER:[TOKEN:[[id number]],ESTABLISHEDPATIENT:[T]],PROVIDER:[TOKEN:[[id number]],ESTABLISHEDPATIENT:[T]]

## 2022-03-17 LAB
CULTURE RESULTS: SIGNIFICANT CHANGE UP
SPECIMEN SOURCE: SIGNIFICANT CHANGE UP

## 2022-03-18 ENCOUNTER — EMERGENCY (EMERGENCY)
Facility: HOSPITAL | Age: 82
LOS: 1 days | Discharge: HOME | End: 2022-03-18
Attending: EMERGENCY MEDICINE | Admitting: EMERGENCY MEDICINE
Payer: MEDICARE

## 2022-03-18 VITALS
TEMPERATURE: 98 F | DIASTOLIC BLOOD PRESSURE: 79 MMHG | OXYGEN SATURATION: 100 % | RESPIRATION RATE: 16 BRPM | HEART RATE: 75 BPM | HEIGHT: 65 IN | SYSTOLIC BLOOD PRESSURE: 163 MMHG

## 2022-03-18 DIAGNOSIS — Z98.89 OTHER SPECIFIED POSTPROCEDURAL STATES: Chronic | ICD-10-CM

## 2022-03-18 DIAGNOSIS — Z98.49 CATARACT EXTRACTION STATUS, UNSPECIFIED EYE: Chronic | ICD-10-CM

## 2022-03-18 LAB
ALBUMIN SERPL ELPH-MCNC: 4.4 G/DL — SIGNIFICANT CHANGE UP (ref 3.3–5)
ALP SERPL-CCNC: 65 U/L — SIGNIFICANT CHANGE UP (ref 40–120)
ALT FLD-CCNC: 12 U/L — SIGNIFICANT CHANGE UP (ref 4–33)
ANION GAP SERPL CALC-SCNC: 10 MMOL/L — SIGNIFICANT CHANGE UP (ref 7–14)
APTT BLD: 33.3 SEC — SIGNIFICANT CHANGE UP (ref 27–36.3)
AST SERPL-CCNC: 13 U/L — SIGNIFICANT CHANGE UP (ref 4–32)
BASOPHILS # BLD AUTO: 0.01 K/UL — SIGNIFICANT CHANGE UP (ref 0–0.2)
BASOPHILS NFR BLD AUTO: 0.3 % — SIGNIFICANT CHANGE UP (ref 0–2)
BILIRUB SERPL-MCNC: 0.3 MG/DL — SIGNIFICANT CHANGE UP (ref 0.2–1.2)
BLD GP AB SCN SERPL QL: NEGATIVE — SIGNIFICANT CHANGE UP
BUN SERPL-MCNC: 6 MG/DL — LOW (ref 7–23)
CALCIUM SERPL-MCNC: 10 MG/DL — SIGNIFICANT CHANGE UP (ref 8.4–10.5)
CHLORIDE SERPL-SCNC: 105 MMOL/L — SIGNIFICANT CHANGE UP (ref 98–107)
CO2 SERPL-SCNC: 27 MMOL/L — SIGNIFICANT CHANGE UP (ref 22–31)
CREAT SERPL-MCNC: 0.83 MG/DL — SIGNIFICANT CHANGE UP (ref 0.5–1.3)
EGFR: 71 ML/MIN/1.73M2 — SIGNIFICANT CHANGE UP
EOSINOPHIL # BLD AUTO: 0.07 K/UL — SIGNIFICANT CHANGE UP (ref 0–0.5)
EOSINOPHIL NFR BLD AUTO: 2.3 % — SIGNIFICANT CHANGE UP (ref 0–6)
FLUAV AG NPH QL: SIGNIFICANT CHANGE UP
FLUBV AG NPH QL: SIGNIFICANT CHANGE UP
GLUCOSE SERPL-MCNC: 99 MG/DL — SIGNIFICANT CHANGE UP (ref 70–99)
HCT VFR BLD CALC: 35.5 % — SIGNIFICANT CHANGE UP (ref 34.5–45)
HGB BLD-MCNC: 12 G/DL — SIGNIFICANT CHANGE UP (ref 11.5–15.5)
IANC: 1.87 K/UL — SIGNIFICANT CHANGE UP (ref 1.5–8.5)
IMM GRANULOCYTES NFR BLD AUTO: 0 % — SIGNIFICANT CHANGE UP (ref 0–1.5)
INR BLD: 1.1 RATIO — SIGNIFICANT CHANGE UP (ref 0.88–1.16)
LIDOCAIN IGE QN: 44 U/L — SIGNIFICANT CHANGE UP (ref 7–60)
LYMPHOCYTES # BLD AUTO: 0.75 K/UL — LOW (ref 1–3.3)
LYMPHOCYTES # BLD AUTO: 24.5 % — SIGNIFICANT CHANGE UP (ref 13–44)
MAGNESIUM SERPL-MCNC: 1.8 MG/DL — SIGNIFICANT CHANGE UP (ref 1.6–2.6)
MCHC RBC-ENTMCNC: 29.6 PG — SIGNIFICANT CHANGE UP (ref 27–34)
MCHC RBC-ENTMCNC: 33.8 GM/DL — SIGNIFICANT CHANGE UP (ref 32–36)
MCV RBC AUTO: 87.7 FL — SIGNIFICANT CHANGE UP (ref 80–100)
MONOCYTES # BLD AUTO: 0.36 K/UL — SIGNIFICANT CHANGE UP (ref 0–0.9)
MONOCYTES NFR BLD AUTO: 11.8 % — SIGNIFICANT CHANGE UP (ref 2–14)
NEUTROPHILS # BLD AUTO: 1.87 K/UL — SIGNIFICANT CHANGE UP (ref 1.8–7.4)
NEUTROPHILS NFR BLD AUTO: 61.1 % — SIGNIFICANT CHANGE UP (ref 43–77)
NRBC # BLD: 0 /100 WBCS — SIGNIFICANT CHANGE UP
NRBC # FLD: 0 K/UL — SIGNIFICANT CHANGE UP
PLATELET # BLD AUTO: 166 K/UL — SIGNIFICANT CHANGE UP (ref 150–400)
POTASSIUM SERPL-MCNC: 4.1 MMOL/L — SIGNIFICANT CHANGE UP (ref 3.5–5.3)
POTASSIUM SERPL-SCNC: 4.1 MMOL/L — SIGNIFICANT CHANGE UP (ref 3.5–5.3)
PROT SERPL-MCNC: 6.9 G/DL — SIGNIFICANT CHANGE UP (ref 6–8.3)
PROTHROM AB SERPL-ACNC: 12.8 SEC — SIGNIFICANT CHANGE UP (ref 10.5–13.4)
RBC # BLD: 4.05 M/UL — SIGNIFICANT CHANGE UP (ref 3.8–5.2)
RBC # FLD: 12.4 % — SIGNIFICANT CHANGE UP (ref 10.3–14.5)
RH IG SCN BLD-IMP: POSITIVE — SIGNIFICANT CHANGE UP
RSV RNA NPH QL NAA+NON-PROBE: SIGNIFICANT CHANGE UP
SARS-COV-2 RNA SPEC QL NAA+PROBE: SIGNIFICANT CHANGE UP
SODIUM SERPL-SCNC: 142 MMOL/L — SIGNIFICANT CHANGE UP (ref 135–145)
TROPONIN T, HIGH SENSITIVITY RESULT: <6 NG/L — SIGNIFICANT CHANGE UP
WBC # BLD: 3.06 K/UL — LOW (ref 3.8–10.5)
WBC # FLD AUTO: 3.06 K/UL — LOW (ref 3.8–10.5)

## 2022-03-18 PROCEDURE — 71275 CT ANGIOGRAPHY CHEST: CPT | Mod: 26,MA

## 2022-03-18 PROCEDURE — 71045 X-RAY EXAM CHEST 1 VIEW: CPT | Mod: 26

## 2022-03-18 PROCEDURE — 99285 EMERGENCY DEPT VISIT HI MDM: CPT | Mod: CS,GC

## 2022-03-18 PROCEDURE — 74174 CTA ABD&PLVS W/CONTRAST: CPT | Mod: 26,MA

## 2022-03-18 RX ORDER — DIPHENHYDRAMINE HCL 50 MG
50 CAPSULE ORAL ONCE
Refills: 0 | Status: COMPLETED | OUTPATIENT
Start: 2022-03-18 | End: 2022-03-18

## 2022-03-18 RX ADMIN — Medication 50 MILLIGRAM(S): at 21:36

## 2022-03-18 RX ADMIN — Medication 40 MILLIGRAM(S): at 19:41

## 2022-03-18 NOTE — ED ADULT TRIAGE NOTE - CHIEF COMPLAINT QUOTE
arrives from PCP office for r/o stroke. Pt c/o "feeling of food stuck in throat", nausea and lower abd pain for the past x2-3 days. Was seen last week for TIA. No slurred speech, facial asymmetry noted. Denies chest pain or SOB. Breathing non-labored, speaking full and complete sentences. PMHX Osteoporosis

## 2022-03-18 NOTE — ED ADULT NURSE NOTE - OBJECTIVE STATEMENT
Patient is awake and alert, sent in from her pcp office for abdominal " discomfort". no chest pain. Feels like something is in her throat when she swallows.

## 2022-03-18 NOTE — ED PROVIDER NOTE - PHYSICAL EXAMINATION
Dr Joshua  mmm. sitting up, nontoxic female.   normal S1-S2 HR 75  good air entry. no rash/vesicles. no ecchymosis of back/chest/abdomen.  soft nontender abdomen. no  rebound. no guarding. no sign of trauma. no CVAT   no pedal edema. no calf tenderness. normal pulses bilateral feet.  AOx3, no focal deficits. CN 2-12 grossly intact. no meningeal signs.

## 2022-03-18 NOTE — ED PROVIDER NOTE - PATIENT PORTAL LINK FT
You can access the FollowMyHealth Patient Portal offered by [facility] by registering at the following website: [email or web address]. By joining [employer]’s FollowMyHealth portal, you will also be able to view your health information using other applications (apps) compatible with our system.

## 2022-03-18 NOTE — ED PROVIDER NOTE - OBJECTIVE STATEMENT
Dr Joshua  80 y/o F with pmhx of osteoporosis, herniated disc, GERD BIBEMS for several complaints. pt was discharged from [facility] two days ago, went to a follow up appointment today w PMD.   Pt was in the office, developed chest pain, nausea, back pain and abdominal cramping. PMD called EMS. pt states she "didn't feel well" was nauseous, no vomit. had midsternal chest tightness, radiate to the back,  "between my shoulder blades" Also endorse cramping abdominal pain, no diarrhea. no dysuria no hematuria. no fever or chills. no numbness/weakness.  pt now feeling better.

## 2022-03-18 NOTE — ED PROVIDER NOTE - PROGRESS NOTE DETAILS
Juan Diego, PGY3: low suspicion acs/aortic/pe, pt with very vague sxs since resolved while here, has had tests done recently without answer, hasn't had CTA for dissect  Pt with diff breathing/abd tightness after IV contrast, pt will get premedicated  due for scan at 1040pm, benadryl at 940pm pt stable, pending ct read, if neg may dc home. close pmd fu.   Sign out to night team KELLI Quezada. PGY-3: patient feels well, without any symptoms at this time. labs stable, CT without acute findings. discussed results with patient, will DC home with PMD f/u

## 2022-03-19 VITALS
SYSTOLIC BLOOD PRESSURE: 138 MMHG | OXYGEN SATURATION: 100 % | RESPIRATION RATE: 15 BRPM | DIASTOLIC BLOOD PRESSURE: 82 MMHG | HEART RATE: 70 BPM | TEMPERATURE: 98 F

## 2022-04-14 NOTE — STROKE CODE NOTE - NSRESATTESTFEL_ED_ALL_CORE_FT
[FreeTextEntry1] : Documented by Karl Jerome acting as a scribe for Dr. Cross on 04/14/2022. \par \par All medical record entries made by the Scribe were at my, Dr. Cross's, direction and\par personally dictated by me on 04/14/2022. I have reviewed the chart and agree that the record\par accurately reflects my personal performance of the history, physical exam, procedure and imaging.  Dr. Emile Crawford

## 2022-06-09 ENCOUNTER — NON-APPOINTMENT (OUTPATIENT)
Age: 82
End: 2022-06-09

## 2022-06-09 ENCOUNTER — APPOINTMENT (OUTPATIENT)
Dept: CARDIOLOGY | Facility: CLINIC | Age: 82
End: 2022-06-09

## 2022-06-09 VITALS
OXYGEN SATURATION: 100 % | TEMPERATURE: 96.4 F | RESPIRATION RATE: 16 BRPM | HEART RATE: 70 BPM | SYSTOLIC BLOOD PRESSURE: 171 MMHG | BODY MASS INDEX: 20.87 KG/M2 | HEIGHT: 65 IN | WEIGHT: 125.25 LBS | DIASTOLIC BLOOD PRESSURE: 78 MMHG

## 2022-06-09 DIAGNOSIS — R03.0 ELEVATED BLOOD-PRESSURE READING, W/OUT DIAGNOSIS OF HYPERTENSION: ICD-10-CM

## 2022-06-09 DIAGNOSIS — M79.604 PAIN IN RIGHT LEG: ICD-10-CM

## 2022-06-09 DIAGNOSIS — M79.605 PAIN IN RIGHT LEG: ICD-10-CM

## 2022-06-09 DIAGNOSIS — R07.89 OTHER CHEST PAIN: ICD-10-CM

## 2022-06-09 PROCEDURE — 93000 ELECTROCARDIOGRAM COMPLETE: CPT

## 2022-06-09 PROCEDURE — 99214 OFFICE O/P EST MOD 30 MIN: CPT

## 2022-06-16 ENCOUNTER — APPOINTMENT (OUTPATIENT)
Dept: INTERNAL MEDICINE | Facility: CLINIC | Age: 82
End: 2022-06-16
Payer: MEDICARE

## 2022-06-16 ENCOUNTER — NON-APPOINTMENT (OUTPATIENT)
Age: 82
End: 2022-06-16

## 2022-06-16 VITALS
BODY MASS INDEX: 20.54 KG/M2 | DIASTOLIC BLOOD PRESSURE: 83 MMHG | SYSTOLIC BLOOD PRESSURE: 171 MMHG | HEART RATE: 68 BPM | WEIGHT: 123.25 LBS | OXYGEN SATURATION: 98 % | HEIGHT: 65 IN

## 2022-06-16 DIAGNOSIS — Z00.00 ENCOUNTER FOR GENERAL ADULT MEDICAL EXAMINATION W/OUT ABNORMAL FINDINGS: ICD-10-CM

## 2022-06-16 DIAGNOSIS — M47.816 SPONDYLOSIS W/OUT MYELOPATHY OR RADICULOPATHY, LUMBAR REGION: ICD-10-CM

## 2022-06-16 PROCEDURE — 36415 COLL VENOUS BLD VENIPUNCTURE: CPT

## 2022-06-16 PROCEDURE — G0439: CPT

## 2022-06-16 RX ORDER — LUTEIN 6 MG
TABLET ORAL
Refills: 0 | Status: ACTIVE | COMMUNITY

## 2022-06-16 RX ORDER — GABAPENTIN 100 MG/1
100 CAPSULE ORAL
Qty: 30 | Refills: 0 | Status: ACTIVE | COMMUNITY
Start: 2022-06-16 | End: 1900-01-01

## 2022-06-16 NOTE — ASSESSMENT
[FreeTextEntry1] : 82 year old female presents for initial annual exam.\par \par Elevated BP, BP averaging around 130-140 over 80s at home.\par -Continue monitoring at home, if consistently above 140/90 to follow-up\par \par Right leg pain.  appears neuropathic in nature he has his history of lumbar herniated disc\par -Advised to get neuro report faxed over to review\par -Advised trial of gabapentin meanwhile continuing workup, risks and benefits of medication discussed in detail \par \par Annual \par -Advise to get yearly Flu shot\par -Advise Yearly Eye exam with Ophthalmologist\par -Advise Yearly Dental exam\par -Educated of the importance of Healthy diet, such as Mediterranean Diet and Exercise, such as walking >20 minutes a day and increasing gradually as tolerated\par \par -covid -up to date \par -[id number] -refusing today\par -discussed shingles vaccine (shingrix), advised to verify with insurance if its covered through medical or prescription plan\par \par \par

## 2022-06-16 NOTE — HISTORY OF PRESENT ILLNESS
[de-identified] : 82 year old female presents for initial annual exam.\par \par has been having right ankle/calf pain at night mostly. started about 1 year ago.   was seen by neurologist in the past, Dr. Lopez.  states nerve conduction studies were normal as per patient.  \par \par walks 3x a week.  \par \par lives with .  able to perform all her ADLs\par \par \par \par \par \par

## 2022-06-16 NOTE — HEALTH RISK ASSESSMENT
[Good] : ~his/her~  mood as  good [Never] : Never [No] : In the past 12 months have you used drugs other than those required for medical reasons? No [No falls in past year] : Patient reported no falls in the past year [0] : 2) Feeling down, depressed, or hopeless: Not at all (0) [PHQ-2 Negative - No further assessment needed] : PHQ-2 Negative - No further assessment needed [With Significant Other] : lives with significant other [] :  [Fully functional (bathing, dressing, toileting, transferring, walking, feeding)] : Fully functional (bathing, dressing, toileting, transferring, walking, feeding) [[id number]] : 0 [Change in mental status noted] : No change in mental status noted [Reports changes in hearing] : Reports no changes in hearing [Reports changes in vision] : Reports no changes in vision

## 2022-06-16 NOTE — PHYSICAL EXAM
[Normal] : normal rate, regular rhythm, normal S1 and S2 and no murmur heard [Pedal Pulses Present] : the pedal pulses are present [No Edema] : there was no peripheral edema [No Extremity Clubbing/Cyanosis] : no extremity clubbing/cyanosis [Soft] : abdomen soft [Non Tender] : non-tender [Non-distended] : non-distended [No HSM] : no HSM [Normal Posterior Cervical Nodes] : no posterior cervical lymphadenopathy [Normal Anterior Cervical Nodes] : no anterior cervical lymphadenopathy [No CVA Tenderness] : no CVA  tenderness [No Spinal Tenderness] : no spinal tenderness [No Joint Swelling] : no joint swelling [Grossly Normal Strength/Tone] : grossly normal strength/tone [No Rash] : no rash [No Focal Deficits] : no focal deficits

## 2022-06-21 ENCOUNTER — CLINICAL ADVICE (OUTPATIENT)
Age: 82
End: 2022-06-21

## 2022-06-21 DIAGNOSIS — D72.819 DECREASED WHITE BLOOD CELL COUNT, UNSPECIFIED: ICD-10-CM

## 2022-06-21 LAB
25(OH)D3 SERPL-MCNC: 46.9 NG/ML
ALBUMIN SERPL ELPH-MCNC: 4.9 G/DL
ALP BLD-CCNC: 70 U/L
ALT SERPL-CCNC: 8 U/L
ANION GAP SERPL CALC-SCNC: 11 MMOL/L
APPEARANCE: CLEAR
AST SERPL-CCNC: 16 U/L
BACTERIA: NEGATIVE
BASOPHILS # BLD AUTO: 0 K/UL
BASOPHILS NFR BLD AUTO: 0 %
BILIRUB DIRECT SERPL-MCNC: 0.1 MG/DL
BILIRUB INDIRECT SERPL-MCNC: 0.2 MG/DL
BILIRUB SERPL-MCNC: 0.4 MG/DL
BILIRUBIN URINE: NEGATIVE
BLOOD URINE: NEGATIVE
BUN SERPL-MCNC: 11 MG/DL
CALCIUM SERPL-MCNC: 10.4 MG/DL
CHLORIDE SERPL-SCNC: 104 MMOL/L
CHOLEST SERPL-MCNC: 198 MG/DL
CO2 SERPL-SCNC: 26 MMOL/L
COLOR: COLORLESS
CREAT SERPL-MCNC: 0.9 MG/DL
EGFR: 64 ML/MIN/1.73M2
EOSINOPHIL # BLD AUTO: 0.07 K/UL
EOSINOPHIL NFR BLD AUTO: 2.5 %
ESTIMATED AVERAGE GLUCOSE: 120 MG/DL
FERRITIN SERPL-MCNC: 91 NG/ML
FOLATE SERPL-MCNC: >20 NG/ML
GLUCOSE QUALITATIVE U: NEGATIVE
GLUCOSE SERPL-MCNC: 93 MG/DL
HBA1C MFR BLD HPLC: 5.8 %
HCT VFR BLD CALC: 36.2 %
HDLC SERPL-MCNC: 70 MG/DL
HGB BLD-MCNC: 12.6 G/DL
HYALINE CASTS: 0 /LPF
IMM GRANULOCYTES NFR BLD AUTO: 0 %
IRON SATN MFR SERPL: 24 %
IRON SERPL-MCNC: 71 UG/DL
KETONES URINE: NEGATIVE
LDLC SERPL CALC-MCNC: 106 MG/DL
LEUKOCYTE ESTERASE URINE: NEGATIVE
LYMPHOCYTES # BLD AUTO: 0.74 K/UL
LYMPHOCYTES NFR BLD AUTO: 26 %
MAN DIFF?: NORMAL
MCHC RBC-ENTMCNC: 30.7 PG
MCHC RBC-ENTMCNC: 34.8 GM/DL
MCV RBC AUTO: 88.1 FL
MICROSCOPIC-UA: NORMAL
MONOCYTES # BLD AUTO: 0.31 K/UL
MONOCYTES NFR BLD AUTO: 10.9 %
NEUTROPHILS # BLD AUTO: 1.73 K/UL
NEUTROPHILS NFR BLD AUTO: 60.6 %
NITRITE URINE: NEGATIVE
NONHDLC SERPL-MCNC: 128 MG/DL
PH URINE: 6.5
PLATELET # BLD AUTO: 175 K/UL
POTASSIUM SERPL-SCNC: 4.4 MMOL/L
PROT SERPL-MCNC: 7.4 G/DL
PROTEIN URINE: NEGATIVE
RBC # BLD: 4.11 M/UL
RBC # FLD: 12.6 %
RED BLOOD CELLS URINE: 1 /HPF
SODIUM SERPL-SCNC: 141 MMOL/L
SPECIFIC GRAVITY URINE: 1.01
SQUAMOUS EPITHELIAL CELLS: 1 /HPF
TIBC SERPL-MCNC: 299 UG/DL
TRIGL SERPL-MCNC: 113 MG/DL
TSH SERPL-ACNC: 1.53 UIU/ML
UIBC SERPL-MCNC: 228 UG/DL
UROBILINOGEN URINE: NORMAL
VIT B12 SERPL-MCNC: 368 PG/ML
WBC # FLD AUTO: 2.85 K/UL
WHITE BLOOD CELLS URINE: 1 /HPF

## 2022-06-26 PROBLEM — M79.604 LEG PAIN, BILATERAL: Status: ACTIVE | Noted: 2021-05-05

## 2022-06-26 PROBLEM — R07.89 ATYPICAL CHEST PAIN: Status: ACTIVE | Noted: 2021-01-21

## 2022-06-26 PROBLEM — R03.0 ELEVATED BP WITHOUT DIAGNOSIS OF HYPERTENSION: Status: ACTIVE | Noted: 2022-06-16

## 2022-06-26 PROBLEM — M79.604 RIGHT LEG PAIN: Status: ACTIVE | Noted: 2021-05-05

## 2022-08-01 ENCOUNTER — NON-APPOINTMENT (OUTPATIENT)
Age: 82
End: 2022-08-01

## 2022-08-28 ENCOUNTER — EMERGENCY (EMERGENCY)
Facility: HOSPITAL | Age: 82
LOS: 1 days | Discharge: HOME | End: 2022-08-28
Attending: EMERGENCY MEDICINE | Admitting: EMERGENCY MEDICINE

## 2022-08-28 VITALS
OXYGEN SATURATION: 100 % | RESPIRATION RATE: 16 BRPM | DIASTOLIC BLOOD PRESSURE: 91 MMHG | TEMPERATURE: 98 F | HEIGHT: 65 IN | SYSTOLIC BLOOD PRESSURE: 175 MMHG | HEART RATE: 77 BPM

## 2022-08-28 DIAGNOSIS — Z98.89 OTHER SPECIFIED POSTPROCEDURAL STATES: Chronic | ICD-10-CM

## 2022-08-28 DIAGNOSIS — Z98.49 CATARACT EXTRACTION STATUS, UNSPECIFIED EYE: Chronic | ICD-10-CM

## 2022-08-28 LAB
ALBUMIN SERPL ELPH-MCNC: 4.8 G/DL — SIGNIFICANT CHANGE UP (ref 3.3–5)
ALP SERPL-CCNC: 71 U/L — SIGNIFICANT CHANGE UP (ref 40–120)
ALT FLD-CCNC: 13 U/L — SIGNIFICANT CHANGE UP (ref 4–33)
ANION GAP SERPL CALC-SCNC: 9 MMOL/L — SIGNIFICANT CHANGE UP (ref 7–14)
APPEARANCE UR: CLEAR — SIGNIFICANT CHANGE UP
AST SERPL-CCNC: 18 U/L — SIGNIFICANT CHANGE UP (ref 4–32)
BACTERIA # UR AUTO: NEGATIVE — SIGNIFICANT CHANGE UP
BASOPHILS # BLD AUTO: 0.01 K/UL — SIGNIFICANT CHANGE UP (ref 0–0.2)
BASOPHILS NFR BLD AUTO: 0.2 % — SIGNIFICANT CHANGE UP (ref 0–2)
BILIRUB SERPL-MCNC: 0.5 MG/DL — SIGNIFICANT CHANGE UP (ref 0.2–1.2)
BILIRUB UR-MCNC: NEGATIVE — SIGNIFICANT CHANGE UP
BUN SERPL-MCNC: 10 MG/DL — SIGNIFICANT CHANGE UP (ref 7–23)
CALCIUM SERPL-MCNC: 10.4 MG/DL — SIGNIFICANT CHANGE UP (ref 8.4–10.5)
CHLORIDE SERPL-SCNC: 104 MMOL/L — SIGNIFICANT CHANGE UP (ref 98–107)
CO2 SERPL-SCNC: 29 MMOL/L — SIGNIFICANT CHANGE UP (ref 22–31)
COLOR SPEC: COLORLESS — SIGNIFICANT CHANGE UP
CREAT SERPL-MCNC: 0.9 MG/DL — SIGNIFICANT CHANGE UP (ref 0.5–1.3)
DIFF PNL FLD: NEGATIVE — SIGNIFICANT CHANGE UP
EGFR: 64 ML/MIN/1.73M2 — SIGNIFICANT CHANGE UP
EOSINOPHIL # BLD AUTO: 0.07 K/UL — SIGNIFICANT CHANGE UP (ref 0–0.5)
EOSINOPHIL NFR BLD AUTO: 1.5 % — SIGNIFICANT CHANGE UP (ref 0–6)
EPI CELLS # UR: 4 /HPF — SIGNIFICANT CHANGE UP (ref 0–5)
GLUCOSE SERPL-MCNC: 105 MG/DL — HIGH (ref 70–99)
GLUCOSE UR QL: NEGATIVE — SIGNIFICANT CHANGE UP
HCT VFR BLD CALC: 38.4 % — SIGNIFICANT CHANGE UP (ref 34.5–45)
HGB BLD-MCNC: 13 G/DL — SIGNIFICANT CHANGE UP (ref 11.5–15.5)
HYALINE CASTS # UR AUTO: 2 /LPF — SIGNIFICANT CHANGE UP (ref 0–7)
IANC: 3.02 K/UL — SIGNIFICANT CHANGE UP (ref 1.8–7.4)
IMM GRANULOCYTES NFR BLD AUTO: 0.2 % — SIGNIFICANT CHANGE UP (ref 0–1.5)
KETONES UR-MCNC: NEGATIVE — SIGNIFICANT CHANGE UP
LEUKOCYTE ESTERASE UR-ACNC: ABNORMAL
LYMPHOCYTES # BLD AUTO: 0.94 K/UL — LOW (ref 1–3.3)
LYMPHOCYTES # BLD AUTO: 20.6 % — SIGNIFICANT CHANGE UP (ref 13–44)
MCHC RBC-ENTMCNC: 29.4 PG — SIGNIFICANT CHANGE UP (ref 27–34)
MCHC RBC-ENTMCNC: 33.9 GM/DL — SIGNIFICANT CHANGE UP (ref 32–36)
MCV RBC AUTO: 86.9 FL — SIGNIFICANT CHANGE UP (ref 80–100)
MONOCYTES # BLD AUTO: 0.52 K/UL — SIGNIFICANT CHANGE UP (ref 0–0.9)
MONOCYTES NFR BLD AUTO: 11.4 % — SIGNIFICANT CHANGE UP (ref 2–14)
NEUTROPHILS # BLD AUTO: 3.02 K/UL — SIGNIFICANT CHANGE UP (ref 1.8–7.4)
NEUTROPHILS NFR BLD AUTO: 66.1 % — SIGNIFICANT CHANGE UP (ref 43–77)
NITRITE UR-MCNC: NEGATIVE — SIGNIFICANT CHANGE UP
NRBC # BLD: 0 /100 WBCS — SIGNIFICANT CHANGE UP (ref 0–0)
NRBC # FLD: 0 K/UL — SIGNIFICANT CHANGE UP (ref 0–0)
PH UR: 7 — SIGNIFICANT CHANGE UP (ref 5–8)
PLATELET # BLD AUTO: 187 K/UL — SIGNIFICANT CHANGE UP (ref 150–400)
POTASSIUM SERPL-MCNC: 4.3 MMOL/L — SIGNIFICANT CHANGE UP (ref 3.5–5.3)
POTASSIUM SERPL-SCNC: 4.3 MMOL/L — SIGNIFICANT CHANGE UP (ref 3.5–5.3)
PROT SERPL-MCNC: 8 G/DL — SIGNIFICANT CHANGE UP (ref 6–8.3)
PROT UR-MCNC: NEGATIVE — SIGNIFICANT CHANGE UP
RBC # BLD: 4.42 M/UL — SIGNIFICANT CHANGE UP (ref 3.8–5.2)
RBC # FLD: 12.3 % — SIGNIFICANT CHANGE UP (ref 10.3–14.5)
RBC CASTS # UR COMP ASSIST: 1 /HPF — SIGNIFICANT CHANGE UP (ref 0–4)
SODIUM SERPL-SCNC: 142 MMOL/L — SIGNIFICANT CHANGE UP (ref 135–145)
SP GR SPEC: 1.01 — LOW (ref 1.01–1.05)
TROPONIN T, HIGH SENSITIVITY RESULT: 8 NG/L — SIGNIFICANT CHANGE UP
UROBILINOGEN FLD QL: SIGNIFICANT CHANGE UP
WBC # BLD: 4.57 K/UL — SIGNIFICANT CHANGE UP (ref 3.8–10.5)
WBC # FLD AUTO: 4.57 K/UL — SIGNIFICANT CHANGE UP (ref 3.8–10.5)
WBC UR QL: 13 /HPF — HIGH (ref 0–5)

## 2022-08-28 PROCEDURE — 99285 EMERGENCY DEPT VISIT HI MDM: CPT | Mod: 25,GC

## 2022-08-28 PROCEDURE — 93010 ELECTROCARDIOGRAM REPORT: CPT | Mod: GC

## 2022-08-28 PROCEDURE — 71046 X-RAY EXAM CHEST 2 VIEWS: CPT | Mod: 26

## 2022-08-28 RX ORDER — FAMOTIDINE 10 MG/ML
20 INJECTION INTRAVENOUS ONCE
Refills: 0 | Status: COMPLETED | OUTPATIENT
Start: 2022-08-28 | End: 2022-08-28

## 2022-08-28 RX ORDER — ACETAMINOPHEN 500 MG
650 TABLET ORAL ONCE
Refills: 0 | Status: COMPLETED | OUTPATIENT
Start: 2022-08-28 | End: 2022-08-28

## 2022-08-28 RX ORDER — CEPHALEXIN 500 MG
1 CAPSULE ORAL
Qty: 28 | Refills: 0
Start: 2022-08-28 | End: 2022-09-03

## 2022-08-28 RX ORDER — LIDOCAINE 4 G/100G
1 CREAM TOPICAL ONCE
Refills: 0 | Status: COMPLETED | OUTPATIENT
Start: 2022-08-28 | End: 2022-08-28

## 2022-08-28 RX ORDER — CEPHALEXIN 500 MG
500 CAPSULE ORAL ONCE
Refills: 0 | Status: DISCONTINUED | OUTPATIENT
Start: 2022-08-28 | End: 2022-08-29

## 2022-08-28 RX ADMIN — FAMOTIDINE 20 MILLIGRAM(S): 10 INJECTION INTRAVENOUS at 23:57

## 2022-08-28 RX ADMIN — LIDOCAINE 1 PATCH: 4 CREAM TOPICAL at 22:08

## 2022-08-28 NOTE — ED PROVIDER NOTE - PATIENT PORTAL LINK FT
You can access the FollowMyHealth Patient Portal offered by [facility] by registering at the following website: [email or web address]. By joining [employer]’s FollowMyHealth portal, you will also be able to view your health information using other applications (apps) compatible with our system.

## 2022-08-28 NOTE — ED ADULT NURSE NOTE - OBJECTIVE STATEMENT
Pt received c/o R shoulder and neck pain x 2-3 months. [id number] CP/SOB. NAD. 20g PIV placed, labs drawn as ordered, urine collected, sent. Awaiting further orders.

## 2022-08-28 NOTE — ED ADULT TRIAGE NOTE - CHIEF COMPLAINT QUOTE
Pt presents to ED ambulatory from home with c/o R shoulder pain radiating up neck x several months worsening over past 2 days. Pt denies fall, injury or trauma.

## 2022-08-28 NOTE — ED PROVIDER NOTE - PHYSICAL EXAMINATION
Gen: Patient is well-appearing, NAD, AAOx3, able to ambulate without assistance  HEENT: NCAT, normal conjunctiva, tongue midline, oral mucosa moist  Lung: CTAB, no respiratory distress, no wheezes/rhonchi/rales B/L, speaking in full sentences  CV: RRR, no murmurs, rubs or gallops, distal pulses 2+ b/l  Abd: soft, NT, ND, no guarding, no rigidity, no rebound tenderness, no CVA tenderness   MSK: no visible deformities, ROM normal in UE/LE, R scapula spasm, no back TTP  Neuro: No focal sensory or motor deficits, normal CN exam, normal coordination   Skin: Warm, well perfused, no rash, no leg swelling  Psych: normal affect, calm

## 2022-08-28 NOTE — ED ADULT TRIAGE NOTE - RESPIRATORY RATE (BREATHS/MIN)
Anesthesia Volume In Cc (Will Not Render If 0): 0.5 Bill For Surgical Tray: no Home Suture Removal Text: Patient was provided a home suture removal kit and will remove their sutures at home.  If they have any questions or difficulties they will call the office. X Depth Of Punch In Cm (Optional): 0 Lab: 6 Epidermal Sutures: 4-0 Ethilon Consent: Written consent was obtained and risks were reviewed including but not limited to scarring, infection, bleeding, scabbing, incomplete removal, nerve damage and allergy to anesthesia. Was A Bandage Applied: Yes 16 Biopsy Type: H and E Hemostasis: None Dressing: bandage Notification Instructions: Patient will be notified of biopsy results. However, patient instructed to call the office if not contacted within 2 weeks. Billing Type: Third-Party Bill Anesthesia Type: 1% lidocaine with epinephrine Detail Level: Detailed Suture Removal: 14 days Wound Care: Petrolatum Punch Size In Mm: 4 Post-Care Instructions: I reviewed with the patient in detail post-care instructions. Patient is to keep the biopsy site dry overnight, and then apply bacitracin twice daily until healed. Patient may apply hydrogen peroxide soaks to remove any crusting.

## 2022-08-28 NOTE — ED PROVIDER NOTE - ATTENDING CONTRIBUTION TO CARE
The patient is a 82y Female who has a past medical and surgery history of Hiatal hernia osteoporosis Hay fever Bladder prolapse, female, acquired Arthritis  rhinoplasty tubal ligation cataract surgery bladder repair PTED with Pt presents to ED ambulatory from home with c/o R shoulder pain radiating up neck x several months worsening over past 2 days. Pt denies fall, injury or trauma.   Vital Signs Last 24 Hrs  T(F): 97.9 HR: 77 BP: 175/91 RR: 16 SpO2: 100% (28 Aug 2022 18:58)   PE: as described; my additions and exceptions are noted in the chart    DATA:  EKG: NSR@66; ?LAE LVH ?age septal infarct which is unchanged from 3/18/22 EKG    LAB: Pending at time of evaluation    IMPRESSION/RISK:  Dx= post scapula neck chest wall post head and arm pain point to brachial plexopathy/radiculopathy   Consideration include Though most probable will screen for ACS  Plan  as above  reassess after results and symptomatic treatment  probable d/c

## 2022-08-28 NOTE — ED PROVIDER NOTE - NSFOLLOWUPINSTRUCTIONS_ED_ALL_ED_FT
Please take the prescribed medication Keflex 500 mg every 6 hours for a total of 7 days.     You may take Tylenol 650 mg every 6 hours as needed for pain.     Please follow up with your Primary Medical Doctor within the next 7 days to monitor your symptoms.     Please come back to the Emergency Room if your symptoms get worse. Please take the prescribed medication Cefpodoxime 100 mg every 12 hours for a total of 7 days.     You may take Tylenol 650 mg every 6 hours as needed for pain.     Please follow up with your Primary Medical Doctor within the next 7 days to monitor your symptoms.     Please come back to the Emergency Room if your symptoms get worse.

## 2022-08-28 NOTE — ED PROVIDER NOTE - OBJECTIVE STATEMENT
82 y F, osteoporosis, herniated disc, presenting for days onset of R scapula pain. Denies trauma, injury, fall. Associated symptoms of epigastric burning. No fever, nausea, vomiting, diarrhea. Patient is able to ranger her R shoulder, changing into gown without difficulty. Patient ambulating independently in the exam room. PMD is Dr. Precious Reveles.

## 2022-08-29 VITALS
DIASTOLIC BLOOD PRESSURE: 89 MMHG | TEMPERATURE: 98 F | OXYGEN SATURATION: 100 % | RESPIRATION RATE: 16 BRPM | HEART RATE: 79 BPM | SYSTOLIC BLOOD PRESSURE: 166 MMHG

## 2022-08-29 RX ORDER — CEFPODOXIME PROXETIL 100 MG
100 TABLET ORAL ONCE
Refills: 0 | Status: COMPLETED | OUTPATIENT
Start: 2022-08-29 | End: 2022-08-29

## 2022-08-29 RX ORDER — CEFPODOXIME PROXETIL 100 MG
1 TABLET ORAL
Qty: 14 | Refills: 0
Start: 2022-08-29 | End: 2022-09-04

## 2022-08-29 RX ADMIN — Medication 100 MILLIGRAM(S): at 00:44

## 2022-08-29 NOTE — ED ADULT NURSE REASSESSMENT NOTE - NS ED NURSE REASSESS COMMENT FT1
Report received from PERCY Gastelum. Patient A&Ox4, respirations even and unlabored. Patient discharged. Medicated per orders. IV line removed by PERCY Gastelum. Patient ambulatory to exit with steady gait.

## 2022-08-30 LAB
CULTURE RESULTS: SIGNIFICANT CHANGE UP
SPECIMEN SOURCE: SIGNIFICANT CHANGE UP

## 2023-03-02 ENCOUNTER — APPOINTMENT (OUTPATIENT)
Dept: GASTROENTEROLOGY | Facility: CLINIC | Age: 83
End: 2023-03-02

## 2023-03-28 ENCOUNTER — TRANSCRIPTION ENCOUNTER (OUTPATIENT)
Age: 83
End: 2023-03-28

## 2023-04-03 ENCOUNTER — APPOINTMENT (OUTPATIENT)
Dept: GASTROENTEROLOGY | Facility: CLINIC | Age: 83
End: 2023-04-03

## 2023-05-03 ENCOUNTER — APPOINTMENT (OUTPATIENT)
Dept: CARDIOLOGY | Facility: CLINIC | Age: 83
End: 2023-05-03

## 2023-05-06 ENCOUNTER — EMERGENCY (EMERGENCY)
Facility: HOSPITAL | Age: 83
LOS: 1 days | Discharge: HOME | End: 2023-05-06
Attending: EMERGENCY MEDICINE | Admitting: EMERGENCY MEDICINE
Payer: MEDICARE

## 2023-05-06 VITALS
OXYGEN SATURATION: 99 % | DIASTOLIC BLOOD PRESSURE: 80 MMHG | HEART RATE: 70 BPM | RESPIRATION RATE: 18 BRPM | SYSTOLIC BLOOD PRESSURE: 147 MMHG | TEMPERATURE: 98 F

## 2023-05-06 VITALS
DIASTOLIC BLOOD PRESSURE: 68 MMHG | TEMPERATURE: 98 F | SYSTOLIC BLOOD PRESSURE: 136 MMHG | RESPIRATION RATE: 18 BRPM | OXYGEN SATURATION: 100 %

## 2023-05-06 DIAGNOSIS — Z98.49 CATARACT EXTRACTION STATUS, UNSPECIFIED EYE: Chronic | ICD-10-CM

## 2023-05-06 DIAGNOSIS — Z98.89 OTHER SPECIFIED POSTPROCEDURAL STATES: Chronic | ICD-10-CM

## 2023-05-06 PROCEDURE — 99284 EMERGENCY DEPT VISIT MOD MDM: CPT

## 2023-05-06 RX ORDER — DICLOFENAC SODIUM 30 MG/G
2 GEL TOPICAL
Qty: 1 | Refills: 0
Start: 2023-05-06 | End: 2023-05-12

## 2023-05-06 RX ORDER — ACETAMINOPHEN 500 MG
650 TABLET ORAL ONCE
Refills: 0 | Status: COMPLETED | OUTPATIENT
Start: 2023-05-06 | End: 2023-05-06

## 2023-05-06 RX ORDER — LIDOCAINE 4 G/100G
1 CREAM TOPICAL ONCE
Refills: 0 | Status: COMPLETED | OUTPATIENT
Start: 2023-05-06 | End: 2023-05-06

## 2023-05-06 RX ADMIN — LIDOCAINE 1 PATCH: 4 CREAM TOPICAL at 13:53

## 2023-05-06 RX ADMIN — Medication 650 MILLIGRAM(S): at 13:54

## 2023-05-06 NOTE — ED PROVIDER NOTE - OBJECTIVE STATEMENT
82 y/.o f with no sig hx p/w 1 month of left-sided neck pain.  Patient denies any falls traumas and states pain is mildly worse when turning his head towards the right.  Patient denies any blurry vision changes no neurologic deficits no radiation of pain or symptoms down the left upper extremity.  Patient otherwise well-appearing hemodynamically stable with no chest pain present.

## 2023-05-06 NOTE — ED PROVIDER NOTE - PATIENT PORTAL LINK FT
You can access the FollowMyHealth Patient Portal offered by [facility] by registering at the following website: [email or web address]. By joining [employer]’s FollowMyHealth portal, you will also be able to view your health information using other applications (apps) compatible with our system.

## 2023-05-06 NOTE — ED ADULT TRIAGE NOTE - CHIEF COMPLAINT QUOTE
pt with co pain to left side of neck ,behind ear and back of lower portion of her head x one month. Pt states pain increases when she moves head from side to side especially to the right. pt reprots intermittent dry mouth x one month.

## 2023-05-06 NOTE — ED ADULT NURSE NOTE - OBJECTIVE STATEMENT
Pt A+Ox4. Pt c/o left sided neck pain that radiates down to her shoulders. Pt states the pain is worse when moving neck.  Pain started about 1 month ago and iis intermittent.  Pt lungs clear, abdomen soft and non tender, skin intact.  Pt medicated as ordered.

## 2023-05-06 NOTE — ED PROVIDER NOTE - CLINICAL SUMMARY MEDICAL DECISION MAKING FREE TEXT BOX
Patient presents with left-sided neck pain with improvement after Tylenol and lidocaine patch.  Will DC with diclofenac topical cream instructed and provided Ortho follow-up for outpatient imaging.  Patient received recent MRI/MRA due to blurry vision although unable to access results here patient denies any history of cervical stenosis or other instabilities.

## 2023-05-06 NOTE — ED ADULT NURSE NOTE - NSSUHOSCREENINGYN_ED_ALL_ED
Follow up blood glucose MD Vance notified     Fidelina Pacheco RN  08/03/21 9260 Yes - the patient is able to be screened

## 2023-05-24 ENCOUNTER — APPOINTMENT (OUTPATIENT)
Dept: CARDIOLOGY | Facility: CLINIC | Age: 83
End: 2023-05-24
Payer: MEDICARE

## 2023-05-24 ENCOUNTER — NON-APPOINTMENT (OUTPATIENT)
Age: 83
End: 2023-05-24

## 2023-05-24 VITALS
WEIGHT: 120 LBS | HEIGHT: 65 IN | TEMPERATURE: 96.4 F | BODY MASS INDEX: 19.99 KG/M2 | OXYGEN SATURATION: 97 % | SYSTOLIC BLOOD PRESSURE: 186 MMHG | HEART RATE: 82 BPM | DIASTOLIC BLOOD PRESSURE: 76 MMHG

## 2023-05-24 VITALS — DIASTOLIC BLOOD PRESSURE: 70 MMHG | SYSTOLIC BLOOD PRESSURE: 130 MMHG

## 2023-05-24 DIAGNOSIS — M79.606 PAIN IN LEG, UNSPECIFIED: ICD-10-CM

## 2023-05-24 DIAGNOSIS — M79.604 PAIN IN RIGHT LEG: ICD-10-CM

## 2023-05-24 PROCEDURE — 93000 ELECTROCARDIOGRAM COMPLETE: CPT

## 2023-05-24 PROCEDURE — 99214 OFFICE O/P EST MOD 30 MIN: CPT

## 2023-05-26 ENCOUNTER — APPOINTMENT (OUTPATIENT)
Dept: CV DIAGNOSITCS | Facility: HOSPITAL | Age: 83
End: 2023-05-26

## 2023-05-26 ENCOUNTER — OUTPATIENT (OUTPATIENT)
Dept: OUTPATIENT SERVICES | Facility: HOSPITAL | Age: 83
LOS: 1 days | End: 2023-05-26
Payer: MEDICARE

## 2023-05-26 DIAGNOSIS — Z98.49 CATARACT EXTRACTION STATUS, UNSPECIFIED EYE: Chronic | ICD-10-CM

## 2023-05-26 DIAGNOSIS — M79.604 PAIN IN RIGHT LEG: ICD-10-CM

## 2023-05-26 DIAGNOSIS — Z98.89 OTHER SPECIFIED POSTPROCEDURAL STATES: Chronic | ICD-10-CM

## 2023-05-26 PROCEDURE — 93970 EXTREMITY STUDY: CPT | Mod: 26

## 2023-05-29 PROBLEM — M79.606 LEG PAIN: Status: ACTIVE | Noted: 2022-06-09

## 2024-03-11 ENCOUNTER — APPOINTMENT (OUTPATIENT)
Dept: GASTROENTEROLOGY | Facility: CLINIC | Age: 84
End: 2024-03-11
Payer: MEDICARE

## 2024-03-11 VITALS
HEART RATE: 71 BPM | BODY MASS INDEX: 19.83 KG/M2 | WEIGHT: 119 LBS | DIASTOLIC BLOOD PRESSURE: 70 MMHG | TEMPERATURE: 96.9 F | SYSTOLIC BLOOD PRESSURE: 158 MMHG | OXYGEN SATURATION: 99 % | HEIGHT: 65 IN

## 2024-03-11 DIAGNOSIS — M62.89 OTHER SPECIFIED DISORDERS OF MUSCLE: ICD-10-CM

## 2024-03-11 DIAGNOSIS — Q43.8 OTHER SPECIFIED CONGENITAL MALFORMATIONS OF INTESTINE: ICD-10-CM

## 2024-03-11 DIAGNOSIS — K62.3 RECTAL PROLAPSE: ICD-10-CM

## 2024-03-11 DIAGNOSIS — Z87.39 PERSONAL HISTORY OF OTHER DISEASES OF THE MUSCULOSKELETAL SYSTEM AND CONNECTIVE TISSUE: ICD-10-CM

## 2024-03-11 DIAGNOSIS — K59.09 OTHER CONSTIPATION: ICD-10-CM

## 2024-03-11 DIAGNOSIS — R14.0 ABDOMINAL DISTENSION (GASEOUS): ICD-10-CM

## 2024-03-11 DIAGNOSIS — M54.50 LOW BACK PAIN, UNSPECIFIED: ICD-10-CM

## 2024-03-11 DIAGNOSIS — Z78.9 OTHER SPECIFIED HEALTH STATUS: ICD-10-CM

## 2024-03-11 PROCEDURE — 99214 OFFICE O/P EST MOD 30 MIN: CPT

## 2024-03-11 PROCEDURE — 82270 OCCULT BLOOD FECES: CPT

## 2024-03-11 NOTE — REVIEW OF SYSTEMS
[Fever] : no fever [Chills] : no chills [Feeling Tired] : not feeling tired [Recent Weight Loss (___ Lbs)] : no recent weight loss [Chest Pain] : no chest pain [Palpitations] : no palpitations [SOB on Exertion] : no shortness of breath during exertion [Abdominal Pain] : no abdominal pain [Vomiting] : no vomiting [Constipation] : no constipation [Diarrhea] : no diarrhea [Heartburn] : no heartburn [Melena (black stool)] : no melena [Fecal Incontinence (soiling)] : fecal incontinence [Bloating (gassiness)] : bloating

## 2024-03-11 NOTE — PHYSICAL EXAM
[Alert] : alert [No Acute Distress] : no acute distress [No Respiratory Distress] : no respiratory distress [Auscultation Breath Sounds / Voice Sounds] : lungs were clear to auscultation bilaterally [Heart Rate And Rhythm] : heart rate was normal and rhythm regular [Murmurs] : no murmurs [Bowel Sounds] : normal bowel sounds [Abdomen Tenderness] : non-tender [Abdomen Soft] : soft [] : no hepatosplenomegaly [de-identified] : There is increased sphincter tone with some minimal rectal prolapse upon straining.  The stool is guaiac negative.

## 2024-03-11 NOTE — HISTORY OF PRESENT ILLNESS
[FreeTextEntry1] : The patient had a colonoscopy done approximately 5 years ago and does not require repeat exam.  The patient has a known redundant colon.

## 2024-03-11 NOTE — ASSESSMENT
[FreeTextEntry1] : The patient is an 83-year-old female who generally enjoys good health.  Tasneem has chronic constipation on the basis of the fact she has a redundant colon.  She was told to start soluble fiber supplements and increase her water intake.  Patient does have a mild degree of rectal prolapse and she was told to avoid straining.  She is not interested in seeing a colorectal surgeon but was told to begin Kegel exercises.  The patient will call me with any acute changes in her gastrointestinal status.

## 2024-03-20 NOTE — STROKE CODE NOTE - INITIAL PRESENTATION
UROLOGY OFFICE CONSULTATION        HISTORY OF PRESENT ILLNESS    This is a 79year old female referred for recurrent UTI. Pt had at least one infection last year and believes she has had two this year. Pt has stress and urge incontinence and overactive bladder. Pt has been previously seen by our office for overactive bladder and urinary urge incontinence. She had Interstim device implanted in 2021 by Dr. Sean Belle. Last visit was 2021. She tried and failed previous treatments including VESIcare, amitriptyline and Botox. Pt takes Xarelto for history of AFib. Patient is a former smoker who quit in [address] [place] [address] he smoked for approximately 10 years less than a quarter pack of cigarettes per day. Patient experiences lower urinary tract infection symptoms consisting of increased incontinence, pressure sensation in the vaginal area, foamy urine, dark urine. She denies dysuria, fever/chills, flank pain or hospitalization. She reports her symptoms resolve with antibiotics. Patient denies history of nephrolithiasis. Pt is not diabetic. She is postmenopausal. She denies any sensation of pelvic organ prolapse. No itching or dryness of vaginal tissues. She is not sexually active. Patient denies abdominal or flank pain. No nausea or vomiting. No fever, chills. Patient has urgency most of the time. Urinary frequency is 8-9 times per day and 3-4 at night. No dysuria or gross hematuria. She feels urine stream is normal without push/strain. She has urge and stress incontinence. She uses 6 heavy pads per day and 2-3 depends at night that can be soaked. She does use her Interstim unit. Pt denies constipation. She does use a bidet. She drinks water, 2 cups of coffee, occasional iced tea and wine. In the last year the patient has had the following infections:  On 2/20/24 patient had Klebsiella UTI, treated with Cipro. On 11/06/23 patient had Klebsiella UTI, treated with unknown. Both samples had resistances.  Reports will be scanned.      PAST MEDICAL HISTORY      Arthropathy (NOS)                                             Thyroid disease                                                 Comment: Enlarged Thyroid only    Spinal stenosis, lumbar                                       Atrial fibrillation (CMD)                       2008*      Comment: cardioverted     Sigmoid Diverticulosis                          2013      Comment: Seen during colonoscopy    Gastroesophageal reflux disease                               Inflammatory bowel disease                                    Urinary tract infection                                       Urinary incontinence                                          Arthritis                                                     Depression                                                    PONV (postoperative nausea and vomiting)                        Comment: back when she had children    Spinal stenosis                                               History of chickenpox                                         Prosthetic joint infection (CMD)                2018    Allergy                                         2015          Anemia                                          2015          Neuromuscular disorder (CMD)                              No known problems                               fibralgia*    Chronic pain                                                  PAST SURGICAL HISTORY      SINUS SURGERY PROC UNLISTED                                   ABDOMEN SURGERY                                                 Comment: Mickels Diverticulum    FOOT/TOES SURGERY PROC UNLISTED                                 Comment: Unspecified foot/toes procedure heel spur and                artificial joint    COLONOSCOPY DIAGNOSTIC                          2013      Comment: Dr. Cammy Poe [facility]) Normal Exam &                Diverticulosis (10 Year F/U)     SECTION, CLASSIC           BACK SURGERY                                    2014      Comment: Lumbar lami    ARTHROSCOPY KNEE MEDIAL OR LAT                  2015      Comment: Norberto    ARTHROSCOPY KNEE MEDIAL OR LAT                  2015      Comment: Norberto    TOTAL KNEE REPLACEMENT                          2016      Comment: Norberto    TOTAL KNEE REPLACEMENT                          2018      Comment: Dr Susanne Sanz    FEMUR/KNEE SURG UNLISTED                        2018      Comment: I&D Right total knee with liner exchange Dr Paula Causey                                               Comment: [employer] bladder stimulator     SECTION, LOW TRANSVERSE                06/15/1991    EYE SURGERY                                     2006    SPINE SURGERY                                   2014       JOINT REPLACEMENT                                & 20*    LIGMT REVISION,KNEE,EXTRA-ARTIC                 2022    SPINAL CORD STIMULATOR IMPLANT                  2024    INTERSTIM REPROGRAM                                             Comment: Had placement in 2019    OUTPATIENT MEDICATIONS  Current Outpatient Medications   Medication Sig    flecainide (TAMBOCOR) 100 MG tablet TAKE 1 TABLET BY MOUTH IN THE  MORNING AND 1 TABLET BY MOUTH IN THE EVENING    metoPROLOL succinate (TOPROL-XL) 25 MG 24 hr tablet Take 1 tablet by mouth daily. HYDROcodone-acetaminophen (NORCO) 5-325 MG per tablet Take 1 tablet by mouth every 6 hours as needed for Pain. (Patient not taking: Reported on 3/20/2024)    gabapentin (NEURONTIN) 600 MG tablet Take 1 tablet by mouth in the morning and 1 tablet at noon and 1 tablet in the evening. DULoxetine (CYMBALTA) 60 MG capsule Take 1 capsule by mouth daily. rivaroxaban (Xarelto) 20 MG Tab Take 1 tablet by mouth daily (with dinner).     pramipexole (MIRAPEX) 0.25 MG tablet Take 1 tablet by mouth in the morning and 1 tablet at noon and 1 tablet in the evening. amoxicillin (AMOXIL) 500 MG capsule Take 4 capsules 1 hour prior to dental appointment    triamcinolone (KENALOG) 0.025 % ointment Apply topically 2 times daily as needed (rash). Apply to face bid    clobetasol (TEMOVATE) 0.05 % ointment Apply to hands  bid (Patient taking differently: as needed. Apply to hands  bid)    hydroCORTisone (CORTIZONE) 2.5 % cream Apply 1 application topically 2 times daily. Very thinly  Twice daily on the affected areas of the eyelids, avoid applying too close to eyes x 5 days, update after 5 days    betamethasone valerate (VALISONE) 0.1 % cream Apply thinly to affected areas 2x daily as needed    acetaminophen (TYLENOL) 500 MG tablet Take 1,000 mg by mouth as needed. B Complex Vitamins (BL VITAMIN B COMPLEX PO) Take by mouth daily. cholecalciferol (VITAMIN D3) 1000 UNIT tablet Take 1,000 Units by mouth daily. Multiple Vitamin (MULTIVITAMIN) capsule Take 1 capsule by mouth daily. No current facility-administered medications for this visit. ALLERGIES    ALLERGIES:   Allergen Reactions    Bactrim Ds SWELLING     Presumed allergy/adverse reaction. Patient developed hand swelling.     Ceftriaxone PRURITUS and Other (See Comments)     Drug fever       FAMILY HISTORY    Family History   Problem Relation Age of Onset    Osteoarthritis Mother     Ophthalmology Mother         Arcelia Peoples    Thyroid Mother     Heart Mother     Dementia/Alzheimers Mother     Osteoarthritis Father     Other Father         TB from army    Cancer Sister         breast CA    Substance Abuse Sister     Depression Sister     Alcohol Abuse Brother     Alcohol Abuse Brother     Heart disease Brother         leaky valve    Cancer Maternal Aunt         breast CA    NEGATIVE FAMILY HX OF Other         no htn or dm. 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   Tobacco Use    Smoking status: Former     Current packs/day: 0.00     Average packs/day: 0.3 packs/day for 15.0 years (3.7 ttl pk-yrs)     Types: Cigarettes     Start date: 1970     Quit date: 1985     Years since quittin.2    Smokeless tobacco: Never   Vaping Use    Vaping Use: never used   Substance and Sexual Activity    Alcohol use: Yes     Alcohol/week: 10.0 standard drinks of alcohol     Types: 5 Glasses of wine, 5 Standard drinks or equivalent per week    Drug use: Yes     Types: Other     Comment: ""edible\""    Sexual activity: Not Currently     Partners: Male     Birth control/protection: Pill, Diaphragm     Comment:  .  partially disabled with spinal injury           REVIEW OF SYSTEMS    A 13 point review of systems was conducted and all systems were negative    PHYSICAL EXAM    Vital Signs:   Blood pressure 122/86, last menstrual period 2008. General: The patient is well developed, well nourished. No acute distress, nontoxic. Appears stated age. Neurologic: Alert and oriented. Normal mood and affect. Cranial Nerves II-XII grossly intact without focal deficits. Skin: Warm and dry. Exposed areas without rash. HEENT: Normocephalic, atraumatic. EOMI, PEERL, sclera anicteric. Nares patent. Oropharynx is clear, mucous membranes are moist.   Neck: Supple and symmetric without swelling or tenderness. Respiratory: Respiratory effort normal.   Back: no cva tenderness   Abdomen:  Abdomen is soft, non-tender, non-distended. Extremities: No Clubbing, cyanosis, or edema.    :  no suprapubic pain     STUDIES  Laboratory Results:      Urinalysis:  Office Visit on 2024   Component Date Value    BLDR Scan mLs 2024 0     POCT Color 2024 Yellow     POCT Appearance 2024 Clear     POCT Glucose Urine 2024 Negative     POCT Bilirubin 2024 Negative     POCT Ketones 2024 Negative     POCT Specific Gravity 2024 1.025     POCT " "Occult Blood 03/20/2024 Negative     POCT pH 03/20/2024 7.0     POCT Protein 03/20/2024 Negative     POCT Urobilinogen 03/20/2024 0.2     Urine Nitrite 03/20/2024 Negative     WBC (Leukocyte) Esterase* 03/20/2024 Negative         ASSESSMENT    1. History of UTI 1-2 per year - samples showing resistances   2. Urge incontinence  3. Stress incontinence   4. Overactive bladder   5. Interstim unit in place     PLAN    UA negative   PVR 0     Pt has several resistances and we may need to consider adding fosfomycin to the cultures and possibly work with infectious disease in the future. We discussed the nature of recurrent urinary tract infections in females. I have explained the rationale for only treating symptomatic bacteriuria. We discussed the potential use of vaginal estrogens to cut down on the frequency of urinary tract infections. We also discussed the differences between ""medical\"" and \""surgical\"" UTI. Surgical UTI refers to an underlying anatomic cause which may be correctable such as hydronephrosis, urinary retention or nephrolithiasis. Medical UTI refers to those recurrent urinary tract infections which do not have an underlying anatomic or correctable cause. Would recommend starting with topical estrogen to the urethra meatus. A prescription is sent to [facility]. Patient advised to place a pea-sized amount of cream to the urethral meatus 3 times per week. I would recommend adding cranberry tablets and D Mannose as well as probiotic. If she has several UTI despite those treatment I would recommend starting methenamine with vitamin-C twice daily to prevent recurrent UTIs depending on her kidney function. We discussed that incontinence may be treated with behavioral therapy, pelvic floor physical therapy, medications, and surgery. In general weight loss,  avoidance of bladder irritants and constipation were recommended.   Kegel exercises were discussed and pelvic physical " therapy referral was offered. Discussed that medications such as anticholinergics, Myrbetriq, and estrace cream are used to treat urgency and related incontinence but are not helpful for stress incontinence. Discussed that surgery is an option if these more conservative methods have not been successful. Cysto with botox injection and Interstim are options for predominant urge incontinence. Midurethral mesh sling and urethral bulking are options for predominantly stress incontinence. Discussed that prior to surgery she may need additional testing with urodynamics. Pt has tried medications, botox and Interstim for incontinence and overactive bladder. She is not interested in further medications but would like to consider botox again or options for stress incontinence in the future. Recommend patient follow up in 6 months with MD to establish care, or sooner if problems or questions.     DONA Dillon ED

## 2025-05-12 NOTE — ED PROVIDER NOTE - HIV OFFER
Spoke to the patient to let her know her orthotics prescription is ready for pick-up at registration desk. Patient stated that will pick tomorrow 05/13.    ----- Message from Marcy Carlin sent at 5/12/2025  2:35 PM CDT -----  Can you tell patient her orthotics prescription is at front registration desk ready for pickup  ----- Message -----  From: Bobby Ferro  Sent: 5/12/2025   2:29 PM CDT  To: [facility] Staff    Type:  RX Refill RequestWho Called: ptRefill or New Rx: newRX Name and Strength:  insert for shoesPreferred Pharmacy: pt said she can pick this up on 5/13/25Ordering Provider:Reach pt via:Best Call Back Number:   [phone] (pt)Additional Information: pt said Dr. Lugo referred her to orthotic to get insert for her shoes; says the staff their said she needs a rx put in   Previously Declined (within the last year)

## 2025-06-15 ENCOUNTER — EMERGENCY (EMERGENCY)
Facility: HOSPITAL | Age: 85
LOS: 1 days | End: 2025-06-15
Attending: STUDENT IN AN ORGANIZED HEALTH CARE EDUCATION/TRAINING PROGRAM | Admitting: STUDENT IN AN ORGANIZED HEALTH CARE EDUCATION/TRAINING PROGRAM
Payer: MEDICARE

## 2025-06-15 VITALS
DIASTOLIC BLOOD PRESSURE: 69 MMHG | HEART RATE: 64 BPM | OXYGEN SATURATION: 98 % | TEMPERATURE: 98 F | SYSTOLIC BLOOD PRESSURE: 187 MMHG | HEIGHT: 65 IN | RESPIRATION RATE: 17 BRPM | WEIGHT: 111.99 LBS

## 2025-06-15 DIAGNOSIS — Z98.89 OTHER SPECIFIED POSTPROCEDURAL STATES: Chronic | ICD-10-CM

## 2025-06-15 DIAGNOSIS — Z98.49 CATARACT EXTRACTION STATUS, UNSPECIFIED EYE: Chronic | ICD-10-CM

## 2025-06-15 LAB
ALBUMIN SERPL ELPH-MCNC: 4.5 G/DL — SIGNIFICANT CHANGE UP (ref 3.3–5)
ALP SERPL-CCNC: 64 U/L — SIGNIFICANT CHANGE UP (ref 40–120)
ALT FLD-CCNC: 16 U/L — SIGNIFICANT CHANGE UP (ref 4–33)
ANION GAP SERPL CALC-SCNC: 11 MMOL/L — SIGNIFICANT CHANGE UP (ref 7–14)
APPEARANCE UR: CLEAR — SIGNIFICANT CHANGE UP
AST SERPL-CCNC: 16 U/L — SIGNIFICANT CHANGE UP (ref 4–32)
BASOPHILS # BLD AUTO: 0.03 K/UL — SIGNIFICANT CHANGE UP (ref 0–0.2)
BASOPHILS NFR BLD AUTO: 0.9 % — SIGNIFICANT CHANGE UP (ref 0–2)
BILIRUB SERPL-MCNC: 0.3 MG/DL — SIGNIFICANT CHANGE UP (ref 0.2–1.2)
BILIRUB UR-MCNC: NEGATIVE — SIGNIFICANT CHANGE UP
BUN SERPL-MCNC: 11 MG/DL — SIGNIFICANT CHANGE UP (ref 7–23)
CALCIUM SERPL-MCNC: 9.6 MG/DL — SIGNIFICANT CHANGE UP (ref 8.4–10.5)
CHLORIDE SERPL-SCNC: 105 MMOL/L — SIGNIFICANT CHANGE UP (ref 98–107)
CO2 SERPL-SCNC: 24 MMOL/L — SIGNIFICANT CHANGE UP (ref 22–31)
COLOR SPEC: YELLOW — SIGNIFICANT CHANGE UP
CREAT SERPL-MCNC: 0.86 MG/DL — SIGNIFICANT CHANGE UP (ref 0.5–1.3)
DIFF PNL FLD: NEGATIVE — SIGNIFICANT CHANGE UP
EGFR: 66 ML/MIN/1.73M2 — SIGNIFICANT CHANGE UP
EGFR: 66 ML/MIN/1.73M2 — SIGNIFICANT CHANGE UP
EOSINOPHIL # BLD AUTO: 0.15 K/UL — SIGNIFICANT CHANGE UP (ref 0–0.5)
EOSINOPHIL NFR BLD AUTO: 4.5 % — SIGNIFICANT CHANGE UP (ref 0–6)
FLUAV AG NPH QL: SIGNIFICANT CHANGE UP
FLUBV AG NPH QL: SIGNIFICANT CHANGE UP
GLUCOSE SERPL-MCNC: 95 MG/DL — SIGNIFICANT CHANGE UP (ref 70–99)
GLUCOSE UR QL: NEGATIVE MG/DL — SIGNIFICANT CHANGE UP
HCT VFR BLD CALC: 35.3 % — SIGNIFICANT CHANGE UP (ref 34.5–45)
HGB BLD-MCNC: 12.1 G/DL — SIGNIFICANT CHANGE UP (ref 11.5–15.5)
IANC: 1.89 K/UL — SIGNIFICANT CHANGE UP (ref 1.8–7.4)
KETONES UR QL: NEGATIVE MG/DL — SIGNIFICANT CHANGE UP
LEUKOCYTE ESTERASE UR-ACNC: ABNORMAL
LYMPHOCYTES # BLD AUTO: 0.83 K/UL — LOW (ref 1–3.3)
LYMPHOCYTES # BLD AUTO: 25 % — SIGNIFICANT CHANGE UP (ref 13–44)
MCHC RBC-ENTMCNC: 29.4 PG — SIGNIFICANT CHANGE UP (ref 27–34)
MCHC RBC-ENTMCNC: 34.3 G/DL — SIGNIFICANT CHANGE UP (ref 32–36)
MCV RBC AUTO: 85.9 FL — SIGNIFICANT CHANGE UP (ref 80–100)
MONOCYTES # BLD AUTO: 0.24 K/UL — SIGNIFICANT CHANGE UP (ref 0–0.9)
MONOCYTES NFR BLD AUTO: 7.1 % — SIGNIFICANT CHANGE UP (ref 2–14)
NEUTROPHILS # BLD AUTO: 1.92 K/UL — SIGNIFICANT CHANGE UP (ref 1.8–7.4)
NEUTROPHILS NFR BLD AUTO: 58 % — SIGNIFICANT CHANGE UP (ref 43–77)
NITRITE UR-MCNC: NEGATIVE — SIGNIFICANT CHANGE UP
PH UR: 7 — SIGNIFICANT CHANGE UP (ref 5–8)
PLATELET # BLD AUTO: 162 K/UL — SIGNIFICANT CHANGE UP (ref 150–400)
POTASSIUM SERPL-MCNC: 3.9 MMOL/L — SIGNIFICANT CHANGE UP (ref 3.5–5.3)
POTASSIUM SERPL-SCNC: 3.9 MMOL/L — SIGNIFICANT CHANGE UP (ref 3.5–5.3)
PROT SERPL-MCNC: 7.1 G/DL — SIGNIFICANT CHANGE UP (ref 6–8.3)
PROT UR-MCNC: NEGATIVE MG/DL — SIGNIFICANT CHANGE UP
RBC # BLD: 4.11 M/UL — SIGNIFICANT CHANGE UP (ref 3.8–5.2)
RBC # FLD: 12.6 % — SIGNIFICANT CHANGE UP (ref 10.3–14.5)
RSV RNA NPH QL NAA+NON-PROBE: SIGNIFICANT CHANGE UP
SARS-COV-2 RNA SPEC QL NAA+PROBE: SIGNIFICANT CHANGE UP
SODIUM SERPL-SCNC: 140 MMOL/L — SIGNIFICANT CHANGE UP (ref 135–145)
SOURCE RESPIRATORY: SIGNIFICANT CHANGE UP
SP GR SPEC: 1.01 — SIGNIFICANT CHANGE UP (ref 1–1.03)
TROPONIN T, HIGH SENSITIVITY RESULT: 7 NG/L — SIGNIFICANT CHANGE UP
UROBILINOGEN FLD QL: 0.2 MG/DL — SIGNIFICANT CHANGE UP (ref 0.2–1)
WBC # BLD: 3.31 K/UL — LOW (ref 3.8–10.5)
WBC # FLD AUTO: 3.31 K/UL — LOW (ref 3.8–10.5)

## 2025-06-15 PROCEDURE — 99285 EMERGENCY DEPT VISIT HI MDM: CPT | Mod: FS

## 2025-06-15 PROCEDURE — 93010 ELECTROCARDIOGRAM REPORT: CPT

## 2025-06-15 PROCEDURE — 70496 CT ANGIOGRAPHY HEAD: CPT | Mod: 26

## 2025-06-15 PROCEDURE — 70498 CT ANGIOGRAPHY NECK: CPT | Mod: 26

## 2025-06-15 PROCEDURE — 71046 X-RAY EXAM CHEST 2 VIEWS: CPT | Mod: 26

## 2025-06-15 RX ORDER — METHYLPREDNISOLONE ACETATE 80 MG/ML
40 INJECTION, SUSPENSION INTRA-ARTICULAR; INTRALESIONAL; INTRAMUSCULAR; SOFT TISSUE ONCE
Refills: 0 | Status: COMPLETED | OUTPATIENT
Start: 2025-06-15 | End: 2025-06-15

## 2025-06-15 RX ORDER — DIPHENHYDRAMINE HCL 12.5MG/5ML
50 ELIXIR ORAL ONCE
Refills: 0 | Status: COMPLETED | OUTPATIENT
Start: 2025-06-15 | End: 2025-06-15

## 2025-06-15 RX ORDER — METHYLPREDNISOLONE ACETATE 80 MG/ML
40 INJECTION, SUSPENSION INTRA-ARTICULAR; INTRALESIONAL; INTRAMUSCULAR; SOFT TISSUE ONCE
Refills: 0 | Status: DISCONTINUED | OUTPATIENT
Start: 2025-06-15 | End: 2025-06-15

## 2025-06-15 RX ADMIN — METHYLPREDNISOLONE ACETATE 40 MILLIGRAM(S): 80 INJECTION, SUSPENSION INTRA-ARTICULAR; INTRALESIONAL; INTRAMUSCULAR; SOFT TISSUE at 18:33

## 2025-06-15 RX ADMIN — Medication 50 MILLIGRAM(S): at 22:27

## 2025-06-15 NOTE — ED PROVIDER NOTE - OBJECTIVE STATEMENT
84 y/o female with no pmhx presents to ED c/o blurry vision and left sided neck pain today. Pt states she woke up feeling well, went to [employer] and bother her feet felt cold from A/C. Then on the way home while driving developed b/l  blurry vision but wasn't that bad and could continue driving. Pt wears glasses at baseline. Blurry vision self resolved after few minutes. While at home, pt developed left sided neck pain that is mild and hard to describe. States feels like a "fish bone" although did not swallow anything sharp. Pt states "something feels off." No difficulty swallowing. No dizziness, headache, n/v, weakness, numbness, tingling, slurred speech, facial droop, CP, SOB, palpitations, fever, chills, dysuria.

## 2025-06-15 NOTE — ED PROVIDER NOTE - PATIENT PORTAL LINK FT
You can access the FollowMyHealth Patient Portal offered by [facility] by registering at the following website: [email or web address]. By joining [employer]’s FollowMyHealth portal, you will also be able to view your health information using other applications (apps) compatible with our system.

## 2025-06-15 NOTE — ED PROVIDER NOTE - PROGRESS NOTE DETAILS
KEMAR Cloud: Received at sign-out pending CTA of head and neck, films reviewed negative for acute pathology.  Upon reassessment patient notes she is feeling well and is asymptomatic.  Counseled regarding signs or symptoms warranting return.  All questions answered, patient verbalized understanding.  Copy of all test results given.

## 2025-06-15 NOTE — ED ADULT NURSE NOTE - OBJECTIVE STATEMENT
Break RN: 86yo female received in room 17. pt A&Ox4, ambulatory, denies pmhx, c/o one episode of blurriness in vision and left sided pain while driving today around 11am. States blurriness in vision is mostly resolved, no other neuro deficit. Patient upper and lower extremities strength equal. No facial droopiness and slurred speech noted. Sensation intact, denies tingling or numbness. Respiration even and non-labored. in NAD. Sinus joaquin on monitor. Pending CT, premedicated as per ordered.

## 2025-06-15 NOTE — ED ADULT NURSE REASSESSMENT NOTE - NS ED NURSE REASSESS COMMENT FT1
report received from PERCY Garcia. pt received A&Ox4 offering no complaints at this time. reports having blurred vision today, "feeling better." no neuro deficits noted at this time. NSR noted on monitor. denies HA, n/v/d, weakness. VS as noted. pt given drink. awaiting CT. safety maintained, call bell within reach

## 2025-06-15 NOTE — ED PROVIDER NOTE - CLINICAL SUMMARY MEDICAL DECISION MAKING FREE TEXT BOX
86 y/o female with no pmhx presents to ED c/o blurry vision and left sided neck pain today. Pt states she woke up feeling well, went to [employer] and bother her feet felt cold from A/C. Then on the way home while driving developed b/l  blurry vision but wasn't that bad and could continue driving. Pt wears glasses at baseline. Blurry vision self resolved after few minutes. While at home, pt developed left sided neck pain that is mild and hard to describe. States feels like a "fish bone" although did not swallow anything sharp. Pt states "something feels off." No difficulty swallowing. No dizziness, headache, n/v, weakness, numbness, tingling, slurred speech, facial droop. no neuro deficits on exam. pt well appearing. plan to check CTA head and neck r/o dissection, labs, UA, viral swab, cxr, ekg, troponin, reassess.

## 2025-06-15 NOTE — ED ADULT NURSE NOTE - NSFALLUNIVINTERV_ED_ALL_ED
Bed/Stretcher in lowest position, wheels locked, appropriate side rails in place/Call bell, personal items and telephone in reach/Instruct patient to call for assistance before getting out of bed/chair/stretcher/Non-slip footwear applied when patient is off stretcher/[place] to call system/Physically safe environment - no spills, clutter or unnecessary equipment/Purposeful proactive rounding/Room/bathroom lighting operational, light cord in reach

## 2025-06-15 NOTE — ED PROVIDER NOTE - NSFOLLOWUPINSTRUCTIONS_ED_ALL_ED_FT
, There are no signs of emergency conditions requiring admission to the hospital on today's workup.  Based on the evaluation, a presumptive diagnosis was made, however, further evaluation may be required by your primary care physician or a specialist for a more definitive diagnosis.  Therefore, please follow-up as directed or return to the Emergency Department if your symptoms change or worsen.    We recommend that you:  See your primary care physician within the next 72 hours for follow up.  Bring a copy of your discharge paperwork (including any test results) to your doctor.        *** Return immediately if you have worsening symptoms, chest pain, Shortness of Breath, abdominal pain, Nausea/Vomiting/Diarrhea, dizziness, weakness, confusion, severe headache, vision changes, urinary symptoms, syncope, falls, trauma, discharge, bleeding, fevers, or any other new/concerning symptoms. ***

## 2025-06-15 NOTE — ED ADULT TRIAGE NOTE - CHIEF COMPLAINT QUOTE
Pt c/o L eye blurry vision and L sided neck pain starting ar 11am. Pt states the vision has gotten a little better, but states she still feels like it it cloudy. Hypertensive in triage. Code stroke activated.  Denies past medical history.

## 2025-06-15 NOTE — ED PROVIDER NOTE - ENMT, MLM
Airway patent, Nasal mucosa clear. Mouth with normal mucosa. Throat has no vesicles, no oropharyngeal exudates and uvula is midline. No neck LAD.

## 2025-06-16 VITALS
RESPIRATION RATE: 16 BRPM | TEMPERATURE: 98 F | DIASTOLIC BLOOD PRESSURE: 82 MMHG | HEART RATE: 69 BPM | OXYGEN SATURATION: 100 %

## 2025-07-13 NOTE — ED PROVIDER NOTE - ATTENDING WITH...
Resident
Patient requests all Lab, Cardiology, and Radiology Results on their Discharge Instructions